# Patient Record
Sex: FEMALE | Race: WHITE | Employment: FULL TIME | URBAN - METROPOLITAN AREA
[De-identification: names, ages, dates, MRNs, and addresses within clinical notes are randomized per-mention and may not be internally consistent; named-entity substitution may affect disease eponyms.]

---

## 2017-03-22 ENCOUNTER — OFFICE VISIT (OUTPATIENT)
Dept: OBGYN CLINIC | Age: 32
End: 2017-03-22

## 2017-03-22 VITALS
BODY MASS INDEX: 24.34 KG/M2 | DIASTOLIC BLOOD PRESSURE: 78 MMHG | WEIGHT: 155.4 LBS | TEMPERATURE: 98.4 F | HEART RATE: 111 BPM | SYSTOLIC BLOOD PRESSURE: 120 MMHG

## 2017-03-22 DIAGNOSIS — D25.1 INTRAMURAL LEIOMYOMA OF UTERUS: ICD-10-CM

## 2017-03-22 DIAGNOSIS — Z86.32 HISTORY OF GESTATIONAL DIABETES: ICD-10-CM

## 2017-03-22 DIAGNOSIS — Z12.4 PAP SMEAR FOR CERVICAL CANCER SCREENING: ICD-10-CM

## 2017-03-22 DIAGNOSIS — Z01.419 WOMEN'S ANNUAL ROUTINE GYNECOLOGICAL EXAMINATION: Primary | ICD-10-CM

## 2017-03-22 DIAGNOSIS — Z87.59 HISTORY OF PREGNANCY INDUCED HYPERTENSION: ICD-10-CM

## 2017-03-22 DIAGNOSIS — N91.2 AMENORRHEA: ICD-10-CM

## 2017-03-22 DIAGNOSIS — N91.2 AMENORRHEA: Primary | ICD-10-CM

## 2017-03-22 LAB
BACTERIA: ABNORMAL /HPF
BILIRUBIN URINE: NEGATIVE
BLOOD, URINE: NEGATIVE
CLARITY: CLEAR
COLOR: YELLOW
CONTROL: NORMAL
CRL: NORMAL CM
EPITHELIAL CELLS, UA: 1 /HPF (ref 0–5)
GLUCOSE URINE: NEGATIVE MG/DL
HYALINE CASTS: 2 /HPF (ref 0–8)
KETONES, URINE: NEGATIVE MG/DL
LEUKOCYTE ESTERASE, URINE: NEGATIVE
MICROSCOPIC EXAMINATION: ABNORMAL
NITRITE, URINE: NEGATIVE
PH UA: 7
PREGNANCY TEST URINE, POC: POSITIVE
PROTEIN UA: NEGATIVE MG/DL
RBC UA: 7 /HPF (ref 0–4)
SAC DIAMETER: NORMAL CM
SPECIFIC GRAVITY UA: 1.02
UROBILINOGEN, URINE: 0.2 E.U./DL
WBC UA: 2 /HPF (ref 0–5)

## 2017-03-22 PROCEDURE — 99395 PREV VISIT EST AGE 18-39: CPT | Performed by: OBSTETRICS & GYNECOLOGY

## 2017-03-22 PROCEDURE — 81025 URINE PREGNANCY TEST: CPT | Performed by: OBSTETRICS & GYNECOLOGY

## 2017-03-22 PROCEDURE — 81001 URINALYSIS AUTO W/SCOPE: CPT | Performed by: OBSTETRICS & GYNECOLOGY

## 2017-03-22 PROCEDURE — 76801 OB US < 14 WKS SINGLE FETUS: CPT | Performed by: OBSTETRICS & GYNECOLOGY

## 2017-03-22 PROCEDURE — 87210 SMEAR WET MOUNT SALINE/INK: CPT | Performed by: OBSTETRICS & GYNECOLOGY

## 2017-03-22 ASSESSMENT — ENCOUNTER SYMPTOMS
VOMITING: 0
CONSTIPATION: 1
NAUSEA: 0
ABDOMINAL PAIN: 0
DIARRHEA: 0
ABDOMINAL DISTENTION: 0
SHORTNESS OF BREATH: 0

## 2017-03-23 LAB
CHLAMYDIA TRACHOMATIS AMPLIFIED DET: NORMAL
N GONORRHOEAE AMPLIFIED DET: NORMAL

## 2017-03-24 LAB — URINE CULTURE, ROUTINE: NORMAL

## 2017-04-17 ENCOUNTER — INITIAL PRENATAL (OUTPATIENT)
Dept: OBGYN CLINIC | Age: 32
End: 2017-04-17

## 2017-04-17 VITALS
HEART RATE: 90 BPM | SYSTOLIC BLOOD PRESSURE: 112 MMHG | DIASTOLIC BLOOD PRESSURE: 70 MMHG | BODY MASS INDEX: 24.25 KG/M2 | WEIGHT: 154.8 LBS

## 2017-04-17 DIAGNOSIS — Z87.59 HISTORY OF PREGNANCY INDUCED HYPERTENSION: ICD-10-CM

## 2017-04-17 DIAGNOSIS — O09.291: ICD-10-CM

## 2017-04-17 DIAGNOSIS — Q21.9 SEPTAL DEFECT, HEART: ICD-10-CM

## 2017-04-17 DIAGNOSIS — O09.891 HISTORY OF PRETERM DELIVERY, CURRENTLY PREGNANT, FIRST TRIMESTER: ICD-10-CM

## 2017-04-17 DIAGNOSIS — D25.1 INTRAMURAL LEIOMYOMA OF UTERUS: ICD-10-CM

## 2017-04-17 DIAGNOSIS — Z34.81 PRENATAL CARE, SUBSEQUENT PREGNANCY, FIRST TRIMESTER: Primary | ICD-10-CM

## 2017-04-17 DIAGNOSIS — Z86.32 HISTORY OF GESTATIONAL DIABETES: ICD-10-CM

## 2017-04-17 LAB
24HR URINE VOLUME (ML): 2050 ML
A/G RATIO: 1.1 (ref 1.1–2.2)
ABO/RH: NORMAL
ALBUMIN SERPL-MCNC: 3.8 G/DL (ref 3.4–5)
ALP BLD-CCNC: 50 U/L (ref 40–129)
ALT SERPL-CCNC: 6 U/L (ref 10–40)
AMPHETAMINE SCREEN, URINE: NORMAL
ANION GAP SERPL CALCULATED.3IONS-SCNC: 17 MMOL/L (ref 3–16)
ANTIBODY SCREEN: NORMAL
AST SERPL-CCNC: 18 U/L (ref 15–37)
BARBITURATE SCREEN URINE: NORMAL
BASOPHILS ABSOLUTE: 0.1 K/UL (ref 0–0.2)
BASOPHILS RELATIVE PERCENT: 0.6 %
BENZODIAZEPINE SCREEN, URINE: NORMAL
BILIRUB SERPL-MCNC: 0.3 MG/DL (ref 0–1)
BUN BLDV-MCNC: 11 MG/DL (ref 7–20)
CALCIUM SERPL-MCNC: 9.7 MG/DL (ref 8.3–10.6)
CANNABINOID SCREEN URINE: NORMAL
CHLORIDE BLD-SCNC: 98 MMOL/L (ref 99–110)
CO2: 21 MMOL/L (ref 21–32)
COCAINE METABOLITE SCREEN URINE: NORMAL
CREAT SERPL-MCNC: <0.5 MG/DL (ref 0.6–1.1)
CREAT SERPL-MCNC: <0.5 MG/DL (ref 0.6–1.2)
CREATININE 24 HOUR URINE: 1.5 G/24HR (ref 0.6–1.5)
EOSINOPHILS ABSOLUTE: 0.1 K/UL (ref 0–0.6)
EOSINOPHILS RELATIVE PERCENT: 0.9 %
GFR AFRICAN AMERICAN: >60
GFR NON-AFRICAN AMERICAN: >60
GLOBULIN: 3.5 G/DL
GLUCOSE BLD-MCNC: 87 MG/DL (ref 70–99)
HCT VFR BLD CALC: 42.9 % (ref 36–48)
HEMOGLOBIN: 13.9 G/DL (ref 12–16)
HEPATITIS B SURFACE ANTIGEN INTERPRETATION: NORMAL
LYMPHOCYTES ABSOLUTE: 1.8 K/UL (ref 1–5.1)
LYMPHOCYTES RELATIVE PERCENT: 15.8 %
Lab: NORMAL
MCH RBC QN AUTO: 29.4 PG (ref 26–34)
MCHC RBC AUTO-ENTMCNC: 32.4 G/DL (ref 31–36)
MCV RBC AUTO: 90.6 FL (ref 80–100)
METHADONE SCREEN, URINE: NORMAL
MONOCYTES ABSOLUTE: 0.6 K/UL (ref 0–1.3)
MONOCYTES RELATIVE PERCENT: 5.1 %
NEUTROPHILS ABSOLUTE: 8.8 K/UL (ref 1.7–7.7)
NEUTROPHILS RELATIVE PERCENT: 77.6 %
OPIATE SCREEN URINE: NORMAL
OXYCODONE URINE: NORMAL
PDW BLD-RTO: 12.8 % (ref 12.4–15.4)
PH UA: 5
PHENCYCLIDINE SCREEN URINE: NORMAL
PLATELET # BLD: 347 K/UL (ref 135–450)
PMV BLD AUTO: 8.4 FL (ref 5–10.5)
POTASSIUM SERPL-SCNC: 4.4 MMOL/L (ref 3.5–5.1)
PROPOXYPHENE SCREEN: NORMAL
PROTEIN 24 HOUR URINE: 0.14 G/24HR
RBC # BLD: 4.73 M/UL (ref 4–5.2)
RUBELLA ANTIBODY IGG: 189.5 IU/ML
SODIUM BLD-SCNC: 136 MMOL/L (ref 136–145)
TOTAL PROTEIN: 7.3 G/DL (ref 6.4–8.2)
TSH REFLEX: 1.09 UIU/ML (ref 0.27–4.2)
URIC ACID, SERUM: 4.7 MG/DL (ref 2.6–6)
WBC # BLD: 11.3 K/UL (ref 4–11)

## 2017-04-17 PROCEDURE — 0501F PRENATAL FLOW SHEET: CPT | Performed by: OBSTETRICS & GYNECOLOGY

## 2017-04-17 PROCEDURE — 36415 COLL VENOUS BLD VENIPUNCTURE: CPT | Performed by: OBSTETRICS & GYNECOLOGY

## 2017-04-17 RX ORDER — ASPIRIN 81 MG/1
81 TABLET ORAL DAILY
COMMUNITY
Start: 2017-04-17 | End: 2017-10-11 | Stop reason: ALTCHOICE

## 2017-04-18 LAB
ESTIMATED AVERAGE GLUCOSE: 105.4 MG/DL
HBA1C MFR BLD: 5.3 %
HIV-1 AND HIV-2 ANTIBODIES: NORMAL
RPR: NORMAL

## 2017-05-08 ENCOUNTER — ROUTINE PRENATAL (OUTPATIENT)
Dept: OBGYN CLINIC | Age: 32
End: 2017-05-08

## 2017-05-08 ENCOUNTER — OFFICE VISIT (OUTPATIENT)
Dept: OBGYN CLINIC | Age: 32
End: 2017-05-08

## 2017-05-08 VITALS
BODY MASS INDEX: 24.75 KG/M2 | HEART RATE: 96 BPM | DIASTOLIC BLOOD PRESSURE: 68 MMHG | WEIGHT: 158 LBS | SYSTOLIC BLOOD PRESSURE: 106 MMHG

## 2017-05-08 DIAGNOSIS — Q21.9 SEPTAL DEFECT, HEART: ICD-10-CM

## 2017-05-08 DIAGNOSIS — Z86.32 HISTORY OF GESTATIONAL DIABETES: ICD-10-CM

## 2017-05-08 DIAGNOSIS — Z87.59 HISTORY OF PREGNANCY INDUCED HYPERTENSION: ICD-10-CM

## 2017-05-08 DIAGNOSIS — D25.9 UTERINE LEIOMYOMA, UNSPECIFIED LOCATION: Primary | ICD-10-CM

## 2017-05-08 DIAGNOSIS — D25.1 INTRAMURAL LEIOMYOMA OF UTERUS: ICD-10-CM

## 2017-05-08 DIAGNOSIS — Z34.82 PRENATAL CARE, SUBSEQUENT PREGNANCY IN SECOND TRIMESTER: Primary | ICD-10-CM

## 2017-05-08 LAB
ABDOMINAL CIRCUMFERENCE: NORMAL CM
BIPARIETAL DIAMETER: NORMAL CM
ESTIMATED FETAL WEIGHT: NORMAL GRAMS
FEMORAL DIAMETER: NORMAL CM
HC/AC: NORMAL
HEAD CIRCUMFERENCE: NORMAL CM

## 2017-05-08 PROCEDURE — 0502F SUBSEQUENT PRENATAL CARE: CPT | Performed by: OBSTETRICS & GYNECOLOGY

## 2017-05-08 PROCEDURE — 76816 OB US FOLLOW-UP PER FETUS: CPT | Performed by: OBSTETRICS & GYNECOLOGY

## 2017-05-09 PROBLEM — Z34.82 PRENATAL CARE, SUBSEQUENT PREGNANCY IN SECOND TRIMESTER: Status: ACTIVE | Noted: 2017-05-09

## 2017-06-05 ENCOUNTER — OFFICE VISIT (OUTPATIENT)
Dept: OBGYN CLINIC | Age: 32
End: 2017-06-05

## 2017-06-05 ENCOUNTER — ROUTINE PRENATAL (OUTPATIENT)
Dept: OBGYN CLINIC | Age: 32
End: 2017-06-05

## 2017-06-05 VITALS
WEIGHT: 162 LBS | HEART RATE: 101 BPM | DIASTOLIC BLOOD PRESSURE: 66 MMHG | SYSTOLIC BLOOD PRESSURE: 120 MMHG | BODY MASS INDEX: 25.37 KG/M2

## 2017-06-05 DIAGNOSIS — Z87.59 HISTORY OF PREGNANCY INDUCED HYPERTENSION: ICD-10-CM

## 2017-06-05 DIAGNOSIS — Z86.32 HISTORY OF GESTATIONAL DIABETES: ICD-10-CM

## 2017-06-05 DIAGNOSIS — Z34.82 PRENATAL CARE, SUBSEQUENT PREGNANCY IN SECOND TRIMESTER: Primary | ICD-10-CM

## 2017-06-05 DIAGNOSIS — Q21.9 SEPTAL DEFECT, HEART: ICD-10-CM

## 2017-06-05 DIAGNOSIS — Z34.92 PRENATAL CARE IN SECOND TRIMESTER: Primary | ICD-10-CM

## 2017-06-05 DIAGNOSIS — D25.1 INTRAMURAL LEIOMYOMA OF UTERUS: ICD-10-CM

## 2017-06-05 PROCEDURE — 76805 OB US >/= 14 WKS SNGL FETUS: CPT | Performed by: OBSTETRICS & GYNECOLOGY

## 2017-06-05 PROCEDURE — 0502F SUBSEQUENT PRENATAL CARE: CPT | Performed by: OBSTETRICS & GYNECOLOGY

## 2017-06-21 ENCOUNTER — ROUTINE PRENATAL (OUTPATIENT)
Dept: OBGYN CLINIC | Age: 32
End: 2017-06-21

## 2017-06-21 ENCOUNTER — OFFICE VISIT (OUTPATIENT)
Dept: OBGYN CLINIC | Age: 32
End: 2017-06-21

## 2017-06-21 VITALS
WEIGHT: 165 LBS | SYSTOLIC BLOOD PRESSURE: 102 MMHG | BODY MASS INDEX: 25.84 KG/M2 | HEART RATE: 102 BPM | DIASTOLIC BLOOD PRESSURE: 60 MMHG

## 2017-06-21 DIAGNOSIS — Z86.32 HISTORY OF GESTATIONAL DIABETES: ICD-10-CM

## 2017-06-21 DIAGNOSIS — O28.3 ABNORMAL FETAL ULTRASOUND: ICD-10-CM

## 2017-06-21 DIAGNOSIS — Z34.82 PRENATAL CARE, SUBSEQUENT PREGNANCY IN SECOND TRIMESTER: Primary | ICD-10-CM

## 2017-06-21 DIAGNOSIS — Q21.9 SEPTAL DEFECT, HEART: ICD-10-CM

## 2017-06-21 DIAGNOSIS — D25.1 INTRAMURAL LEIOMYOMA OF UTERUS: ICD-10-CM

## 2017-06-21 DIAGNOSIS — O09.892 HISTORY OF PRETERM DELIVERY, CURRENTLY PREGNANT, SECOND TRIMESTER: Primary | ICD-10-CM

## 2017-06-21 DIAGNOSIS — Z87.59 HISTORY OF PREGNANCY INDUCED HYPERTENSION: ICD-10-CM

## 2017-06-21 PROCEDURE — 76815 OB US LIMITED FETUS(S): CPT | Performed by: OBSTETRICS & GYNECOLOGY

## 2017-06-21 PROCEDURE — 0502F SUBSEQUENT PRENATAL CARE: CPT | Performed by: OBSTETRICS & GYNECOLOGY

## 2017-06-27 ENCOUNTER — ROUTINE PRENATAL (OUTPATIENT)
Dept: PERINATAL CARE | Age: 32
End: 2017-06-27

## 2017-06-27 ENCOUNTER — HOSPITAL ENCOUNTER (OUTPATIENT)
Dept: OTHER | Age: 32
Discharge: HOME | End: 2017-06-30
Attending: OBSTETRICS & GYNECOLOGY | Admitting: OBSTETRICS & GYNECOLOGY

## 2017-06-27 DIAGNOSIS — Z03.73 SUSPECTED FETAL ANOMALY NOT FOUND: Primary | ICD-10-CM

## 2017-07-03 ENCOUNTER — ROUTINE PRENATAL (OUTPATIENT)
Dept: OBGYN CLINIC | Age: 32
End: 2017-07-03

## 2017-07-03 VITALS
BODY MASS INDEX: 26.16 KG/M2 | SYSTOLIC BLOOD PRESSURE: 112 MMHG | WEIGHT: 167 LBS | HEART RATE: 102 BPM | DIASTOLIC BLOOD PRESSURE: 72 MMHG

## 2017-07-03 DIAGNOSIS — Z34.82 PRENATAL CARE, SUBSEQUENT PREGNANCY IN SECOND TRIMESTER: Primary | ICD-10-CM

## 2017-07-03 DIAGNOSIS — Z86.32 HISTORY OF GESTATIONAL DIABETES: ICD-10-CM

## 2017-07-03 DIAGNOSIS — D25.1 INTRAMURAL LEIOMYOMA OF UTERUS: ICD-10-CM

## 2017-07-03 DIAGNOSIS — Q21.9 SEPTAL DEFECT, HEART: ICD-10-CM

## 2017-07-03 DIAGNOSIS — Z87.59 HISTORY OF PREGNANCY INDUCED HYPERTENSION: ICD-10-CM

## 2017-07-03 LAB — GLUCOSE CHALLENGE: 103 MG/DL

## 2017-07-03 PROCEDURE — 0502F SUBSEQUENT PRENATAL CARE: CPT | Performed by: OBSTETRICS & GYNECOLOGY

## 2017-07-03 PROCEDURE — 36415 COLL VENOUS BLD VENIPUNCTURE: CPT | Performed by: OBSTETRICS & GYNECOLOGY

## 2017-08-02 ENCOUNTER — ROUTINE PRENATAL (OUTPATIENT)
Dept: OBGYN CLINIC | Age: 32
End: 2017-08-02

## 2017-08-02 VITALS
WEIGHT: 174.8 LBS | BODY MASS INDEX: 27.44 KG/M2 | DIASTOLIC BLOOD PRESSURE: 68 MMHG | HEIGHT: 67 IN | HEART RATE: 110 BPM | SYSTOLIC BLOOD PRESSURE: 118 MMHG

## 2017-08-02 DIAGNOSIS — D25.1 INTRAMURAL LEIOMYOMA OF UTERUS: ICD-10-CM

## 2017-08-02 DIAGNOSIS — Z86.32 HISTORY OF GESTATIONAL DIABETES: ICD-10-CM

## 2017-08-02 DIAGNOSIS — Z87.59 HISTORY OF PREGNANCY INDUCED HYPERTENSION: ICD-10-CM

## 2017-08-02 DIAGNOSIS — Z34.83 PRENATAL CARE, SUBSEQUENT PREGNANCY IN THIRD TRIMESTER: Primary | ICD-10-CM

## 2017-08-02 LAB
BASOPHILS ABSOLUTE: 0 K/UL (ref 0–0.2)
BASOPHILS RELATIVE PERCENT: 0.3 %
EOSINOPHILS ABSOLUTE: 0.1 K/UL (ref 0–0.6)
EOSINOPHILS RELATIVE PERCENT: 0.8 %
GLUCOSE CHALLENGE: 131 MG/DL
HCT VFR BLD CALC: 36.9 % (ref 36–48)
HEMOGLOBIN: 12.2 G/DL (ref 12–16)
LYMPHOCYTES ABSOLUTE: 2.1 K/UL (ref 1–5.1)
LYMPHOCYTES RELATIVE PERCENT: 17.4 %
MCH RBC QN AUTO: 30 PG (ref 26–34)
MCHC RBC AUTO-ENTMCNC: 33.2 G/DL (ref 31–36)
MCV RBC AUTO: 90.5 FL (ref 80–100)
MONOCYTES ABSOLUTE: 0.9 K/UL (ref 0–1.3)
MONOCYTES RELATIVE PERCENT: 8 %
NEUTROPHILS ABSOLUTE: 8.7 K/UL (ref 1.7–7.7)
NEUTROPHILS RELATIVE PERCENT: 73.5 %
PDW BLD-RTO: 14.5 % (ref 12.4–15.4)
PLATELET # BLD: 257 K/UL (ref 135–450)
PMV BLD AUTO: 8.2 FL (ref 5–10.5)
RBC # BLD: 4.07 M/UL (ref 4–5.2)
WBC # BLD: 11.8 K/UL (ref 4–11)

## 2017-08-02 PROCEDURE — 0502F SUBSEQUENT PRENATAL CARE: CPT | Performed by: OBSTETRICS & GYNECOLOGY

## 2017-08-02 PROCEDURE — 90471 IMMUNIZATION ADMIN: CPT | Performed by: OBSTETRICS & GYNECOLOGY

## 2017-08-02 PROCEDURE — 36415 COLL VENOUS BLD VENIPUNCTURE: CPT | Performed by: OBSTETRICS & GYNECOLOGY

## 2017-08-02 PROCEDURE — 90715 TDAP VACCINE 7 YRS/> IM: CPT | Performed by: OBSTETRICS & GYNECOLOGY

## 2017-08-03 LAB
ESTIMATED AVERAGE GLUCOSE: 108.3 MG/DL
HBA1C MFR BLD: 5.4 %

## 2017-08-04 ENCOUNTER — NURSE ONLY (OUTPATIENT)
Dept: OBGYN CLINIC | Age: 32
End: 2017-08-04

## 2017-08-04 VITALS
SYSTOLIC BLOOD PRESSURE: 112 MMHG | TEMPERATURE: 98.2 F | WEIGHT: 171.38 LBS | HEART RATE: 101 BPM | BODY MASS INDEX: 27.25 KG/M2 | DIASTOLIC BLOOD PRESSURE: 66 MMHG

## 2017-08-04 DIAGNOSIS — R73.09 ELEVATED GLUCOSE TOLERANCE TEST: Primary | ICD-10-CM

## 2017-08-04 LAB
GLUCOSE FASTING: 65 MG/DL
GLUCOSE TOLERANCE TEST 1 HOUR: 199 MG/DL
GLUCOSE TOLERANCE TEST 2 HOUR: 144 MG/DL
GLUCOSE TOLERANCE TEST 3 HOUR: 161 MG/DL

## 2017-08-08 ENCOUNTER — TELEPHONE (OUTPATIENT)
Dept: OBGYN CLINIC | Age: 32
End: 2017-08-08

## 2017-08-08 RX ORDER — BLOOD-GLUCOSE METER
1 KIT MISCELLANEOUS SEE ADMIN INSTRUCTIONS
Qty: 1 KIT | Refills: 0 | Status: SHIPPED | OUTPATIENT
Start: 2017-08-08 | End: 2021-03-08

## 2017-08-08 RX ORDER — LANCETS
1 EACH MISCELLANEOUS SEE ADMIN INSTRUCTIONS
Qty: 100 EACH | Refills: 3 | Status: SHIPPED | OUTPATIENT
Start: 2017-08-08 | End: 2021-03-08

## 2017-08-08 RX ORDER — UBIQUINOL 100 MG
1 CAPSULE ORAL SEE ADMIN INSTRUCTIONS
Qty: 100 EACH | Refills: 3 | Status: SHIPPED | OUTPATIENT
Start: 2017-08-08 | End: 2021-03-08

## 2017-08-16 ENCOUNTER — ROUTINE PRENATAL (OUTPATIENT)
Dept: OBGYN CLINIC | Age: 32
End: 2017-08-16

## 2017-08-16 ENCOUNTER — TELEPHONE (OUTPATIENT)
Dept: OBGYN CLINIC | Age: 32
End: 2017-08-16

## 2017-08-16 VITALS
BODY MASS INDEX: 27.28 KG/M2 | HEART RATE: 106 BPM | SYSTOLIC BLOOD PRESSURE: 114 MMHG | WEIGHT: 171.6 LBS | DIASTOLIC BLOOD PRESSURE: 62 MMHG

## 2017-08-16 DIAGNOSIS — D25.1 INTRAMURAL LEIOMYOMA OF UTERUS: ICD-10-CM

## 2017-08-16 DIAGNOSIS — O24.410 DIET CONTROLLED GESTATIONAL DIABETES MELLITUS (GDM) IN THIRD TRIMESTER: ICD-10-CM

## 2017-08-16 DIAGNOSIS — Z87.59 HISTORY OF PREGNANCY INDUCED HYPERTENSION: ICD-10-CM

## 2017-08-16 DIAGNOSIS — Z34.83 PRENATAL CARE, SUBSEQUENT PREGNANCY IN THIRD TRIMESTER: Primary | ICD-10-CM

## 2017-08-16 DIAGNOSIS — Z86.32 HISTORY OF GESTATIONAL DIABETES: ICD-10-CM

## 2017-08-16 DIAGNOSIS — Q21.9 SEPTAL DEFECT, HEART: ICD-10-CM

## 2017-08-16 PROCEDURE — 0502F SUBSEQUENT PRENATAL CARE: CPT | Performed by: OBSTETRICS & GYNECOLOGY

## 2017-08-18 PROBLEM — O24.410 DIET CONTROLLED GESTATIONAL DIABETES MELLITUS (GDM) IN THIRD TRIMESTER: Status: ACTIVE | Noted: 2017-08-18

## 2017-08-30 ENCOUNTER — OFFICE VISIT (OUTPATIENT)
Dept: OBGYN CLINIC | Age: 32
End: 2017-08-30

## 2017-08-30 ENCOUNTER — ROUTINE PRENATAL (OUTPATIENT)
Dept: OBGYN CLINIC | Age: 32
End: 2017-08-30

## 2017-08-30 VITALS
BODY MASS INDEX: 27.41 KG/M2 | WEIGHT: 172.4 LBS | SYSTOLIC BLOOD PRESSURE: 124 MMHG | DIASTOLIC BLOOD PRESSURE: 78 MMHG | HEART RATE: 94 BPM

## 2017-08-30 DIAGNOSIS — Z86.32 HISTORY OF GESTATIONAL DIABETES: ICD-10-CM

## 2017-08-30 DIAGNOSIS — Z34.83 PRENATAL CARE, SUBSEQUENT PREGNANCY IN THIRD TRIMESTER: Primary | ICD-10-CM

## 2017-08-30 DIAGNOSIS — Z87.59 HISTORY OF PREGNANCY INDUCED HYPERTENSION: ICD-10-CM

## 2017-08-30 DIAGNOSIS — O24.410 DIET CONTROLLED GESTATIONAL DIABETES MELLITUS (GDM) IN THIRD TRIMESTER: ICD-10-CM

## 2017-08-30 DIAGNOSIS — Z86.32 HISTORY OF GESTATIONAL DIABETES: Primary | ICD-10-CM

## 2017-08-30 DIAGNOSIS — D25.1 INTRAMURAL LEIOMYOMA OF UTERUS: ICD-10-CM

## 2017-08-30 DIAGNOSIS — Q21.9 SEPTAL DEFECT, HEART: ICD-10-CM

## 2017-08-30 PROCEDURE — 0502F SUBSEQUENT PRENATAL CARE: CPT | Performed by: OBSTETRICS & GYNECOLOGY

## 2017-08-30 PROCEDURE — 76815 OB US LIMITED FETUS(S): CPT | Performed by: OBSTETRICS & GYNECOLOGY

## 2017-08-30 RX ORDER — GLYBURIDE 2.5 MG/1
2.5 TABLET ORAL
Qty: 30 TABLET | Refills: 3 | Status: SHIPPED | OUTPATIENT
Start: 2017-08-30 | End: 2017-09-13 | Stop reason: ALTCHOICE

## 2017-08-31 ENCOUNTER — TELEPHONE (OUTPATIENT)
Dept: OBGYN CLINIC | Age: 32
End: 2017-08-31

## 2017-09-01 ENCOUNTER — TELEPHONE (OUTPATIENT)
Dept: OBGYN CLINIC | Age: 32
End: 2017-09-01

## 2017-09-01 RX ORDER — BLOOD SUGAR DIAGNOSTIC
1 STRIP MISCELLANEOUS DAILY
Qty: 100 EACH | Refills: 3 | Status: ON HOLD | OUTPATIENT
Start: 2017-09-01 | End: 2017-10-19 | Stop reason: HOSPADM

## 2017-09-13 ENCOUNTER — ROUTINE PRENATAL (OUTPATIENT)
Dept: OBGYN CLINIC | Age: 32
End: 2017-09-13

## 2017-09-13 VITALS
HEART RATE: 108 BPM | SYSTOLIC BLOOD PRESSURE: 108 MMHG | DIASTOLIC BLOOD PRESSURE: 74 MMHG | BODY MASS INDEX: 27.57 KG/M2 | WEIGHT: 173.4 LBS

## 2017-09-13 DIAGNOSIS — Q21.9 SEPTAL DEFECT, HEART: ICD-10-CM

## 2017-09-13 DIAGNOSIS — Z86.32 HISTORY OF GESTATIONAL DIABETES: ICD-10-CM

## 2017-09-13 DIAGNOSIS — D25.1 INTRAMURAL LEIOMYOMA OF UTERUS: ICD-10-CM

## 2017-09-13 DIAGNOSIS — Z34.03 ENCOUNTER FOR SUPERVISION OF NORMAL FIRST PREGNANCY IN THIRD TRIMESTER: ICD-10-CM

## 2017-09-13 DIAGNOSIS — Z34.83 PRENATAL CARE, SUBSEQUENT PREGNANCY IN THIRD TRIMESTER: Primary | ICD-10-CM

## 2017-09-13 DIAGNOSIS — O24.410 DIET CONTROLLED GESTATIONAL DIABETES MELLITUS (GDM) IN THIRD TRIMESTER: ICD-10-CM

## 2017-09-13 DIAGNOSIS — Z87.59 HISTORY OF PREGNANCY INDUCED HYPERTENSION: ICD-10-CM

## 2017-09-13 PROCEDURE — 0502F SUBSEQUENT PRENATAL CARE: CPT | Performed by: OBSTETRICS & GYNECOLOGY

## 2017-09-19 ENCOUNTER — ROUTINE PRENATAL (OUTPATIENT)
Dept: OBGYN CLINIC | Age: 32
End: 2017-09-19

## 2017-09-19 VITALS
DIASTOLIC BLOOD PRESSURE: 60 MMHG | WEIGHT: 172 LBS | HEART RATE: 102 BPM | SYSTOLIC BLOOD PRESSURE: 112 MMHG | BODY MASS INDEX: 27.35 KG/M2

## 2017-09-19 DIAGNOSIS — Z87.59 HISTORY OF PREGNANCY INDUCED HYPERTENSION: ICD-10-CM

## 2017-09-19 DIAGNOSIS — Z34.83 PRENATAL CARE, SUBSEQUENT PREGNANCY IN THIRD TRIMESTER: Primary | ICD-10-CM

## 2017-09-19 DIAGNOSIS — O24.414 INSULIN CONTROLLED GESTATIONAL DIABETES MELLITUS (GDM) IN THIRD TRIMESTER: ICD-10-CM

## 2017-09-19 DIAGNOSIS — D25.1 INTRAMURAL LEIOMYOMA OF UTERUS: ICD-10-CM

## 2017-09-19 PROCEDURE — 0502F SUBSEQUENT PRENATAL CARE: CPT | Performed by: OBSTETRICS & GYNECOLOGY

## 2017-09-19 PROCEDURE — 59025 FETAL NON-STRESS TEST: CPT | Performed by: OBSTETRICS & GYNECOLOGY

## 2017-09-25 PROBLEM — O24.414 INSULIN CONTROLLED GESTATIONAL DIABETES MELLITUS (GDM) IN THIRD TRIMESTER: Status: ACTIVE | Noted: 2017-08-18

## 2017-09-27 ENCOUNTER — OFFICE VISIT (OUTPATIENT)
Dept: OBGYN CLINIC | Age: 32
End: 2017-09-27

## 2017-09-27 ENCOUNTER — ROUTINE PRENATAL (OUTPATIENT)
Dept: OBGYN CLINIC | Age: 32
End: 2017-09-27

## 2017-09-27 VITALS
WEIGHT: 171.8 LBS | DIASTOLIC BLOOD PRESSURE: 80 MMHG | BODY MASS INDEX: 27.31 KG/M2 | SYSTOLIC BLOOD PRESSURE: 112 MMHG | HEART RATE: 109 BPM

## 2017-09-27 DIAGNOSIS — Z87.59 HISTORY OF PREGNANCY INDUCED HYPERTENSION: Primary | ICD-10-CM

## 2017-09-27 DIAGNOSIS — D25.1 INTRAMURAL LEIOMYOMA OF UTERUS: ICD-10-CM

## 2017-09-27 DIAGNOSIS — O24.414 INSULIN CONTROLLED GESTATIONAL DIABETES MELLITUS (GDM) IN THIRD TRIMESTER: ICD-10-CM

## 2017-09-27 DIAGNOSIS — Z86.32 HISTORY OF GESTATIONAL DIABETES: ICD-10-CM

## 2017-09-27 DIAGNOSIS — Z87.59 HISTORY OF PREGNANCY INDUCED HYPERTENSION: ICD-10-CM

## 2017-09-27 DIAGNOSIS — Q21.9 SEPTAL DEFECT, HEART: ICD-10-CM

## 2017-09-27 DIAGNOSIS — Z34.83 PRENATAL CARE, SUBSEQUENT PREGNANCY IN THIRD TRIMESTER: Primary | ICD-10-CM

## 2017-09-27 PROCEDURE — 59025 FETAL NON-STRESS TEST: CPT | Performed by: OBSTETRICS & GYNECOLOGY

## 2017-09-27 PROCEDURE — 0502F SUBSEQUENT PRENATAL CARE: CPT | Performed by: OBSTETRICS & GYNECOLOGY

## 2017-09-27 PROCEDURE — 76815 OB US LIMITED FETUS(S): CPT | Performed by: OBSTETRICS & GYNECOLOGY

## 2017-09-27 NOTE — MR AVS SNAPSHOT
Susceptibility testing of penicillin and other beta-lactams is  not necessary for beta hemolytic Streptococci since resistant  strains have not been identified. (CLSI M341-M28, 2017)        Narrative           ORDER#: [id number]                          ORDERED BY: Roger Olson  SOURCE: Vagina                             COLLECTED:  09/27/17 17:07  ANTIBIOTICS AT [facility].:                      RECEIVED :  09/27/17 21:57  Performed at:  [place] [address], [place] [facility] [address]   Phone [phone]      Result Notes           Notes Recorded by Alisha Carmichael MA on 10/2/2017 at 8:35 AM  [phone] (home) [phone] (work)  Spoke to patient, she is aware of test results. ------    Notes Recorded by Xin Fotnanez DO on 10/1/2017 at 8:17 AM  Please notify patient of positive group B beta strep testing. Will require antibiotics while in labor. [place] [address] - [place] FETAL NON-STRESS TEST      Result Information     Status          Final result (Resulted: 9/27/2017)           10/5/2017  5:41 PM      Impression           NON STRESS TEST    DATE: 9/27/17  :   Sean Read. CAESAR Matthews      COMPARISON: 9/19/17  INDICATION: insulin controlled diabetes    IMPRESSION:  Fetal heart tones: 130's,  NST category: 1, reactive,  Tocometry: no contractions.                       Additional Information        Basic Information     Date Of Birth Sex Race Ethnicity Preferred Language    1985 Female White Non-/Non  English      Problem List as of 9/27/2017  Date Reviewed: 12/9/2015                Insulin controlled gestational diabetes mellitus (GDM) in third trimester    Prenatal care, subsequent pregnancy in third trimester    Septal defect, heart    History of gestational diabetes    History of pregnancy induced hypertension    Uterine fibroid      Immunizations as of 9/27/2017     Name Date    Influenza Virus Vaccine 10/30/2015 Tdap (Boostrix, Adacel) 8/2/2017, 8/7/2015      Preventive Care        Date Due    Yearly Flu Vaccine (1) 9/1/2017    Pap Smear 3/22/2020    Tetanus Combination Vaccine (2 - Td) 8/2/2027            MyChart Signup           [employer] allows you to send messages to your doctor, view your test results, renew your prescriptions, schedule appointments, view visit notes, and more. How Do I Sign Up? 1. In your Internet browser, go to [email or web address]  2. Click on the Sign Up Now link in the Sign In box. You will see the New Member Sign Up page. 3. Enter your [employer] Access Code exactly as it appears below. You will not need to use this code after youve completed the sign-up process. If you do not sign up before the expiration date, you must request a new code. [employer] Access Code: [email or web address]  Expires: 10/30/2017 12:12 AM    4. Enter your Social Security Number ([id number]) and Date of Birth (mm/dd/yyyy) as indicated and click Submit. You will be taken to the next sign-up page. 5. Create a [employer] ID. This will be your [employer] login ID and cannot be changed, so think of one that is secure and easy to remember. 6. Create a [employer] password. You can change your password at any time. 7. Enter your Password Reset Question and Answer. This can be used at a later time if you forget your password. 8. Enter your e-mail address. You will receive e-mail notification when new information is available in [address]. 9. Click Sign Up. You can now view your medical record. Additional Information  If you have questions, please contact the physician practice where you receive care. Remember, [employer] is NOT to be used for urgent needs. For medical emergencies, dial 911. For questions regarding your [employer] account call [phone]. If you have a clinical question, please call your doctor's office.

## 2017-09-30 LAB
GROUP B STREP CULTURE: ABNORMAL
GROUP B STREP CULTURE: ABNORMAL
ORGANISM: ABNORMAL

## 2017-10-02 ENCOUNTER — ROUTINE PRENATAL (OUTPATIENT)
Dept: OBGYN CLINIC | Age: 32
End: 2017-10-02

## 2017-10-02 VITALS
WEIGHT: 172.38 LBS | DIASTOLIC BLOOD PRESSURE: 64 MMHG | HEART RATE: 101 BPM | SYSTOLIC BLOOD PRESSURE: 114 MMHG | BODY MASS INDEX: 27.41 KG/M2

## 2017-10-02 DIAGNOSIS — Z86.32 HISTORY OF GESTATIONAL DIABETES: ICD-10-CM

## 2017-10-02 DIAGNOSIS — O24.414 INSULIN CONTROLLED GESTATIONAL DIABETES MELLITUS (GDM) IN THIRD TRIMESTER: ICD-10-CM

## 2017-10-02 DIAGNOSIS — D25.1 INTRAMURAL LEIOMYOMA OF UTERUS: ICD-10-CM

## 2017-10-02 DIAGNOSIS — Z87.59 HISTORY OF PREGNANCY INDUCED HYPERTENSION: ICD-10-CM

## 2017-10-02 DIAGNOSIS — Z34.83 PRENATAL CARE, SUBSEQUENT PREGNANCY IN THIRD TRIMESTER: Primary | ICD-10-CM

## 2017-10-02 DIAGNOSIS — Q21.9 SEPTAL DEFECT, HEART: ICD-10-CM

## 2017-10-02 PROCEDURE — 59025 FETAL NON-STRESS TEST: CPT | Performed by: OBSTETRICS & GYNECOLOGY

## 2017-10-02 PROCEDURE — 0502F SUBSEQUENT PRENATAL CARE: CPT | Performed by: OBSTETRICS & GYNECOLOGY

## 2017-10-02 NOTE — PROGRESS NOTES
Temp:97.6 f oral  Maternal emotional well being screening form completed and reviewed with patient. Current score is 0. Patient given referral to [address] [place] [address] ([phone]):  No

## 2017-10-05 NOTE — PROGRESS NOTES
Maternal emotional well being screening form completed and reviewed with patient. Current score is 0.      Patient given referral to [address] [place] [address] ([phone]): No  Temp:98.0F
NON-STRESS TEST   2. Insulin controlled gestational diabetes mellitus (GDM) in third trimester  [address] - [place] FETAL NON-STRESS TEST   3. Septal defect, heart     4. History of gestational diabetes     5. History of pregnancy induced hypertension     6.  Intramural leiomyoma of uterus       Orders Placed This Encounter   Procedures    Strep B Screen, Vaginal / Rectal    36239 - [place] FETAL NON-STRESS TEST    adjustments to insulin reviewed  Glucoregulation    Follow up prn and 1 week

## 2017-10-09 NOTE — PROGRESS NOTES
No vaginal bleeding, no LOF, no contractions, +FM  2/50/-3 vertex    Glucose levels:  Fasting glucose: 83-99  1 hour postprandial breakfast:  105-138  1 hour postprandial lunch:   1 hour postprandial dinner: 108-126  Bedtime:         NON STRESS TEST    DATE: 10/2/17    :  Lawrence Matthews D.O.    COMPARISON: 9/27/17    IMPRESSION:  Fetal heart tones: 140's,  NST category: 1, reactive,  Tocometry: no contractions. Maternal Wellness Questionnaire reviewed--no concerns    1. Prenatal care, subsequent pregnancy in third trimester  87718 - [place] FETAL NON-STRESS TEST   2. History of gestational diabetes     3. History of pregnancy induced hypertension     4. Intramural leiomyoma of uterus     5. Septal defect, heart     6. Insulin controlled gestational diabetes mellitus (GDM) in third trimester  [address]82 - [place] FETAL NON-STRESS TEST     Orders Placed This Encounter   Procedures    50628 - [place] FETAL NON-STRESS TEST     Continue with present insulin dosing  Labor precautions, kick counts  Follow up in 1 week.

## 2017-10-11 ENCOUNTER — OFFICE VISIT (OUTPATIENT)
Dept: OBGYN CLINIC | Age: 32
End: 2017-10-11

## 2017-10-11 ENCOUNTER — ROUTINE PRENATAL (OUTPATIENT)
Dept: OBGYN CLINIC | Age: 32
End: 2017-10-11

## 2017-10-11 VITALS
SYSTOLIC BLOOD PRESSURE: 112 MMHG | DIASTOLIC BLOOD PRESSURE: 64 MMHG | WEIGHT: 174.2 LBS | HEART RATE: 106 BPM | BODY MASS INDEX: 27.7 KG/M2

## 2017-10-11 DIAGNOSIS — Z34.83 PRENATAL CARE, SUBSEQUENT PREGNANCY IN THIRD TRIMESTER: Primary | ICD-10-CM

## 2017-10-11 DIAGNOSIS — D25.1 INTRAMURAL LEIOMYOMA OF UTERUS: ICD-10-CM

## 2017-10-11 DIAGNOSIS — O24.414 INSULIN CONTROLLED GESTATIONAL DIABETES MELLITUS (GDM) IN THIRD TRIMESTER: ICD-10-CM

## 2017-10-11 DIAGNOSIS — Z87.59 HISTORY OF PREGNANCY INDUCED HYPERTENSION: ICD-10-CM

## 2017-10-11 DIAGNOSIS — O24.414 INSULIN CONTROLLED GESTATIONAL DIABETES MELLITUS (GDM) IN THIRD TRIMESTER: Primary | ICD-10-CM

## 2017-10-11 DIAGNOSIS — Q21.9 SEPTAL DEFECT, HEART: ICD-10-CM

## 2017-10-11 DIAGNOSIS — O99.820 GROUP B STREPTOCOCCUS CARRIER, +RV CULTURE, CURRENTLY PREGNANT: ICD-10-CM

## 2017-10-11 DIAGNOSIS — Z86.32 HISTORY OF GESTATIONAL DIABETES: ICD-10-CM

## 2017-10-11 PROCEDURE — 0502F SUBSEQUENT PRENATAL CARE: CPT | Performed by: OBSTETRICS & GYNECOLOGY

## 2017-10-11 PROCEDURE — 76818 FETAL BIOPHYS PROFILE W/NST: CPT | Performed by: OBSTETRICS & GYNECOLOGY

## 2017-10-11 RX ORDER — AZITHROMYCIN 500 MG/1
500 TABLET, FILM COATED ORAL DAILY
Qty: 1 PACKET | Refills: 0 | Status: CANCELLED | OUTPATIENT
Start: 2017-10-11 | End: 2017-10-14

## 2017-10-11 RX ORDER — AZITHROMYCIN 250 MG/1
TABLET, FILM COATED ORAL
Qty: 1 PACKET | Refills: 0 | Status: ON HOLD | OUTPATIENT
Start: 2017-10-11 | End: 2017-10-19 | Stop reason: HOSPADM

## 2017-10-14 PROBLEM — O99.820 GROUP B STREPTOCOCCUS CARRIER, +RV CULTURE, CURRENTLY PREGNANT: Status: ACTIVE | Noted: 2017-10-14

## 2017-11-02 ENCOUNTER — POSTPARTUM VISIT (OUTPATIENT)
Dept: OBGYN CLINIC | Age: 32
End: 2017-11-02

## 2017-11-02 ENCOUNTER — TELEPHONE (OUTPATIENT)
Dept: OBGYN CLINIC | Age: 32
End: 2017-11-02

## 2017-11-02 VITALS
BODY MASS INDEX: 24.31 KG/M2 | HEART RATE: 107 BPM | TEMPERATURE: 98.7 F | DIASTOLIC BLOOD PRESSURE: 70 MMHG | WEIGHT: 150.6 LBS | SYSTOLIC BLOOD PRESSURE: 114 MMHG

## 2017-11-02 DIAGNOSIS — Z86.32 HISTORY OF GESTATIONAL DIABETES: ICD-10-CM

## 2017-11-02 DIAGNOSIS — N61.0 ACUTE MASTITIS OF LEFT BREAST: Primary | ICD-10-CM

## 2017-11-02 PROCEDURE — 99213 OFFICE O/P EST LOW 20 MIN: CPT | Performed by: OBSTETRICS & GYNECOLOGY

## 2017-11-02 RX ORDER — CEPHALEXIN 500 MG/1
500 CAPSULE ORAL 4 TIMES DAILY
Qty: 40 CAPSULE | Refills: 0 | Status: CANCELLED | OUTPATIENT
Start: 2017-11-02 | End: 2017-11-12

## 2017-11-02 RX ORDER — AMOXICILLIN AND CLAVULANATE POTASSIUM 875; 125 MG/1; MG/1
1 TABLET, FILM COATED ORAL 2 TIMES DAILY
Qty: 20 TABLET | Refills: 0 | Status: SHIPPED | OUTPATIENT
Start: 2017-11-02 | End: 2017-11-12

## 2017-11-02 NOTE — TELEPHONE ENCOUNTER
Patient calling states yesterday morning she woke up with left breast soreness. Reports last night she had a fever of 102 degree F. States she did take ibuprofen and has not had a fever this AM. Reports redness to left breast today and is very sore to touch. Patient wanting to see if she can get antibiotic sent to her pharmacy? Informed patient I would review with physican and recall. Prompted order if OK. Do you want to work patient into schedule for appointment?

## 2017-11-09 PROBLEM — O99.820 GROUP B STREPTOCOCCUS CARRIER, +RV CULTURE, CURRENTLY PREGNANT: Status: RESOLVED | Noted: 2017-10-14 | Resolved: 2017-11-09

## 2017-11-09 PROBLEM — Z34.83 PRENATAL CARE, SUBSEQUENT PREGNANCY IN THIRD TRIMESTER: Status: RESOLVED | Noted: 2017-05-09 | Resolved: 2017-11-09

## 2017-11-09 ASSESSMENT — ENCOUNTER SYMPTOMS
VOMITING: 0
SHORTNESS OF BREATH: 0
CONSTIPATION: 0
NAUSEA: 0
GASTROINTESTINAL NEGATIVE: 1
DIARRHEA: 0
RESPIRATORY NEGATIVE: 1
ABDOMINAL PAIN: 0

## 2017-11-09 NOTE — PROGRESS NOTES
tenderness and discharge. No breast bleeding. Genitourinary Comments: Left breast swelling and erythema consistent with acute mastitis. Area marked with skin pen. Neurological: She is alert and oriented to person, place, and time. Skin: Skin is warm and dry. She is not diaphoretic. Psychiatric: She has a normal mood and affect. Her behavior is normal. Judgment and thought content normal.   Nursing note and vitals reviewed. Assessment:      1. Acute mastitis of left breast     2. Lactating mother     3. History of gestational diabetes             Plan:      Rx Augmentin  Warm compresses  Continue breast feeding and pumping to empty breasts  Follow up in 2 weeks for postpartum visit.

## 2017-11-15 ENCOUNTER — POSTPARTUM VISIT (OUTPATIENT)
Dept: OBGYN CLINIC | Age: 32
End: 2017-11-15

## 2017-11-15 VITALS
WEIGHT: 145.6 LBS | HEART RATE: 93 BPM | TEMPERATURE: 98 F | DIASTOLIC BLOOD PRESSURE: 64 MMHG | SYSTOLIC BLOOD PRESSURE: 112 MMHG | BODY MASS INDEX: 23.5 KG/M2

## 2017-11-15 DIAGNOSIS — Z30.011 ENCOUNTER FOR INITIAL PRESCRIPTION OF CONTRACEPTIVE PILLS: ICD-10-CM

## 2017-11-15 DIAGNOSIS — Z87.59 HISTORY OF PREGNANCY INDUCED HYPERTENSION: ICD-10-CM

## 2017-11-15 DIAGNOSIS — Z86.32 HISTORY OF GESTATIONAL DIABETES: ICD-10-CM

## 2017-11-15 DIAGNOSIS — D25.1 INTRAMURAL LEIOMYOMA OF UTERUS: ICD-10-CM

## 2017-11-15 DIAGNOSIS — Q21.9 SEPTAL DEFECT, HEART: ICD-10-CM

## 2017-11-15 PROCEDURE — 0503F POSTPARTUM CARE VISIT: CPT | Performed by: OBSTETRICS & GYNECOLOGY

## 2017-11-15 RX ORDER — NORETHINDRONE ACETATE AND ETHINYL ESTRADIOL 1.5-30(21)
1 KIT ORAL DAILY
Qty: 3 PACKET | Refills: 3 | Status: SHIPPED | OUTPATIENT
Start: 2017-11-15 | End: 2021-03-08

## 2017-11-15 NOTE — PROGRESS NOTES
Maternal emotional well being screening form completed and reviewed with patient. Current score is 0. Patient given referral to [address] [place] [address] ([phone]):  No

## 2017-11-25 PROBLEM — O24.414 INSULIN CONTROLLED GESTATIONAL DIABETES MELLITUS (GDM) IN THIRD TRIMESTER: Status: RESOLVED | Noted: 2017-08-18 | Resolved: 2017-11-25

## 2017-11-25 ASSESSMENT — ENCOUNTER SYMPTOMS
DIARRHEA: 0
VOMITING: 0
GASTROINTESTINAL NEGATIVE: 1
SHORTNESS OF BREATH: 0
CONSTIPATION: 0
RESPIRATORY NEGATIVE: 1
NAUSEA: 0
ABDOMINAL PAIN: 0

## 2017-11-27 ENCOUNTER — NURSE ONLY (OUTPATIENT)
Dept: OBGYN CLINIC | Age: 32
End: 2017-11-27

## 2017-11-27 VITALS
TEMPERATURE: 97.8 F | WEIGHT: 146.4 LBS | HEART RATE: 81 BPM | DIASTOLIC BLOOD PRESSURE: 72 MMHG | SYSTOLIC BLOOD PRESSURE: 118 MMHG | BODY MASS INDEX: 23.63 KG/M2

## 2017-11-27 DIAGNOSIS — Z86.32 HISTORY OF GESTATIONAL DIABETES: Primary | ICD-10-CM

## 2017-11-27 LAB
GLUCOSE FASTING: 74 MG/DL (ref 0–99)
GLUCOSE TOLERANCE TEST 2 HOUR: 100 MG/DL

## 2017-11-27 PROCEDURE — 36415 COLL VENOUS BLD VENIPUNCTURE: CPT | Performed by: OBSTETRICS & GYNECOLOGY

## 2017-11-27 NOTE — PROGRESS NOTES
Blood draw from R Antecubital x 1 attempt without difficulty. 1 PST tube drawn. Patient tolerated well.  Devika Loaiza

## 2018-05-16 ENCOUNTER — TELEPHONE (OUTPATIENT)
Dept: OBGYN CLINIC | Age: 33
End: 2018-05-16

## 2020-10-05 ENCOUNTER — OFFICE VISIT (OUTPATIENT)
Dept: OBGYN CLINIC | Age: 35
End: 2020-10-05
Payer: COMMERCIAL

## 2020-10-05 VITALS
BODY MASS INDEX: 21.56 KG/M2 | HEIGHT: 67 IN | SYSTOLIC BLOOD PRESSURE: 136 MMHG | WEIGHT: 137.4 LBS | TEMPERATURE: 98.4 F | DIASTOLIC BLOOD PRESSURE: 84 MMHG | HEART RATE: 84 BPM

## 2020-10-05 PROCEDURE — 76856 US EXAM PELVIC COMPLETE: CPT | Performed by: OBSTETRICS & GYNECOLOGY

## 2020-10-05 PROCEDURE — 99214 OFFICE O/P EST MOD 30 MIN: CPT | Performed by: OBSTETRICS & GYNECOLOGY

## 2020-10-05 RX ORDER — NORGESTIMATE AND ETHINYL ESTRADIOL 7DAYSX3 28
1 KIT ORAL DAILY
Qty: 28 TABLET | Refills: 3 | Status: SHIPPED | OUTPATIENT
Start: 2020-10-05 | End: 2021-03-08

## 2020-10-05 NOTE — PROGRESS NOTES
Last PAP was normal; March/2017. Abnormal pap history?  No  Last HPV screen: 2017 Negative  Patient has never had a mammogram.  Last Dexa Scan: N/A   Contraceptive method: Birth Control  No prior colonoscopy

## 2020-10-06 PROBLEM — R18.8 FREE FLUID IN PELVIS: Status: ACTIVE | Noted: 2020-10-06

## 2020-10-06 PROBLEM — N20.0 RENAL LITHIASIS: Status: ACTIVE | Noted: 2020-10-06

## 2020-10-06 ASSESSMENT — ENCOUNTER SYMPTOMS
SHORTNESS OF BREATH: 0
NAUSEA: 0
DIARRHEA: 0
VOMITING: 0
ABDOMINAL DISTENTION: 0
CONSTIPATION: 0
ABDOMINAL PAIN: 0

## 2020-10-06 NOTE — PROGRESS NOTES
Subjective:      Patient ID: Echo Parker is a 28 y.o. female. HPI  27 y/o  female presents for evaluation secondary to heavy and painful menses. Menses occur every month x 1 week, heavy, changes super plus tampon every hour, 6-8 tampons and pads every day, +dysmenorrhea. Has had chronic menstrual issues. Patient is 3 years S/P uncomplicated Vaginal Delivery. The pregnancy was complicated by gestational diabetes A2, previous pregnancies with gestational DM, gestational HTN, fetal demise at 21 weeks and uterine fibroid. Postpartum period was complicated by acute mastitis. Patient is no longer breast feeding. Sexually active, no problems. Does not desire future childbearing. Medical history positive for recent issues with renal lithiasis. Review of Systems   Constitutional: Negative for activity change, appetite change, chills, fatigue, fever and unexpected weight change. Respiratory: Negative for shortness of breath. Cardiovascular: Negative for chest pain. Gastrointestinal: Negative for abdominal distention, abdominal pain, constipation, diarrhea, nausea and vomiting. Endocrine: Negative for cold intolerance and heat intolerance. Genitourinary: Positive for menstrual problem and pelvic pain (dysmenorrhea). Negative for difficulty urinating, dyspareunia, dysuria, frequency, genital sores, hematuria, vaginal bleeding, vaginal discharge and vaginal pain. Skin: Negative for rash. Neurological: Negative for headaches. Hematological: Does not bruise/bleed easily. Objective:   Physical Exam  Vitals signs and nursing note reviewed. Exam conducted with a chaperone present. Constitutional:       General: She is not in acute distress. Appearance: Normal appearance. She is well-developed and normal weight. She is not ill-appearing, toxic-appearing or diaphoretic. HENT:      Head: Normocephalic and atraumatic.    Pulmonary:      Effort: Pulmonary effort is normal. Abdominal:      General: There is no distension. Tenderness: There is no abdominal tenderness. There is no guarding. Genitourinary:     General: Normal vulva. Exam position: Lithotomy position. Pubic Area: No rash. Labia:         Right: No rash, tenderness, lesion or injury. Left: No rash, tenderness, lesion or injury. Vagina: No signs of injury and foreign body. No vaginal discharge, erythema, tenderness, bleeding or lesions. Cervix: No cervical motion tenderness, discharge, friability, lesion, erythema or cervical bleeding. Uterus: Not tender. Skin:     General: Skin is warm and dry. Neurological:      Mental Status: She is alert and oriented to person, place, and time. Psychiatric:         Behavior: Behavior normal.         Thought Content: Thought content normal.         Judgment: Judgment normal.       PELVIC ULTRASOUND without DOPPLER INTERROGATION   NON OB    DATE: 10/5/2020    PHYSICIAN: GENIA Matthews D.O.     SONOGRAPHER: Karen Pineda RDMS    INDICATION: menorrhagia    TYPE OF SCAN: vaginal    FINDINGS:    There is a moderate amount of pelvic free fluid with several septations noted. The cervix is normal and not enlarged. Nabothian cyst/s is not noted within the uterine cervix. The uterus measures 7.69 cm x 5.52 cm x 4.87 cm. The uterus is retroverted. The endometrium measures 8.23 mm. The myometrium is heterogeneous in appearance. A single fundal fibroid is noted measuring 2.00 x 2.47 x 3.32cm. The right ovary is present and normal.  The right ovary measures 3.38 cm x 3.35 cm x 2.20 cm. Ovary findings: No masses seen. The right adnexa is normal.  The left ovary is present and normal.  The left ovary measures 3.06 cm x 2.41 cm x 2.11 cm. Ovary findings: No masses seen. The left adnexa is normal.    IMPRESSION: Fibroid uterus. Pelvic free fluid with septations noted. Normal right ovary. Normal left ovary.   Normal blood flow seen to right and left ovary. Imaging is limited secondary to bowel gas. The patient is well aware of the limitations of ultrasound in the detection of anomalies of the abdomen and pelvis. Assessment:       Diagnosis Orders   1. Menorrhagia with regular cycle  PAP SMEAR   2. Pap smear for cervical cancer screening  PAP SMEAR   3. Intramural leiomyoma of uterus     4. Free fluid in pelvis     5. History of gestational diabetes     6. History of pregnancy induced hypertension     7. Renal lithiasis             Plan:      Orders Placed This Encounter   Procedures    PAP SMEAR    Human papillomavirus (HPV) DNA probe thin prep high risk       Options for treatment of menorrhagia, dysmenorrhea and uterine fibroids reviewed. Rx trial OCPs.    Menstrual diary  Follow up in 3-6 months               Aracely Leung DO

## 2020-10-07 LAB
HPV COMMENT: NORMAL
HPV TYPE 16: NOT DETECTED
HPV TYPE 18: NOT DETECTED
HPVOH (OTHER TYPES): NOT DETECTED

## 2020-10-09 ENCOUNTER — TELEPHONE (OUTPATIENT)
Dept: OBGYN CLINIC | Age: 35
End: 2020-10-09

## 2020-10-09 RX ORDER — HYDROCODONE BITARTRATE AND ACETAMINOPHEN 5; 325 MG/1; MG/1
2 TABLET ORAL EVERY 6 HOURS PRN
Qty: 10 TABLET | Refills: 0 | Status: SHIPPED | OUTPATIENT
Start: 2020-10-09 | End: 2020-10-12

## 2020-10-09 NOTE — TELEPHONE ENCOUNTER
Discussed current symptoms with patient. Advised patient that she will need to report to ER if symptoms worsen/persist.  Bleeding and pain precautions reviewed. Patient states symptoms are severe but occur every month with menstrual cycle. Rx for Vicodin printed--#10. Will fill Rx until able to start OCPs prescribed at recent office visit. Patient to  Rx this afternoon. Thanks.

## 2020-10-12 NOTE — TELEPHONE ENCOUNTER
Pt calling asking if Dr Fermin Sanchez will call her regarding her Pain. She states she has been up since 11:30 and the pain is still the same. She would like possibly something different but is asking if you can call her again. Please advise.

## 2020-10-14 NOTE — TELEPHONE ENCOUNTER
Patient is feeling much better, her symptoms improved. Routing to Dr. Sudhir Arreola as Kika Hinojosa.

## 2020-12-07 ENCOUNTER — TELEPHONE (OUTPATIENT)
Dept: OBGYN CLINIC | Age: 35
End: 2020-12-07

## 2020-12-07 DIAGNOSIS — R10.2 PELVIC PAIN IN FEMALE: Primary | ICD-10-CM

## 2020-12-07 DIAGNOSIS — N94.6 DYSMENORRHEA: ICD-10-CM

## 2020-12-07 DIAGNOSIS — N92.0 MENORRHAGIA WITH REGULAR CYCLE: ICD-10-CM

## 2020-12-07 NOTE — TELEPHONE ENCOUNTER
Pt calling due to abdominal cramps. She says she gets the same  pain monthly. She is due to start her period in about 5 days. She rates her pain 7/10. The pain also radiates to her back. She says you are well aware of her issue. She says she is taking both Tylenol and motrin. Last dose was 2 hours ago but it's not helping. She says she has No N/V. She denies any urinary frequency, urgency or dysuria. She denies any vaginal discharge at this time. She is asking if you could give something stronger for pain. Pt was requesting to speak with you Directly. Please advise.

## 2020-12-09 RX ORDER — HYDROCODONE BITARTRATE AND ACETAMINOPHEN 5; 325 MG/1; MG/1
1 TABLET ORAL EVERY 6 HOURS PRN
Qty: 10 TABLET | Refills: 0 | Status: SHIPPED | OUTPATIENT
Start: 2020-12-09 | End: 2021-01-04 | Stop reason: SDUPTHER

## 2020-12-09 NOTE — TELEPHONE ENCOUNTER
Patient continues to experience debilitating pain and problems with menstrual cycle despite trial of OCPs. In light of severe dysmenorrhea and uterine fibroids (failed conservative measures) will proceed with hysterectomy. Rx for #10 vicodin printed. Please schedule patient for Howard Hector robotic total laparoscopic hysterectomy, bilateral salpingectomy, possible bilateral oophorectomy secondary to severe dysmenorrhea and uterine fibroids. Patient will be coming in today at 10:30 for script and to start a surgery packet with Rony Thanks.

## 2020-12-16 ENCOUNTER — TELEPHONE (OUTPATIENT)
Dept: OBGYN CLINIC | Age: 35
End: 2020-12-16

## 2020-12-16 NOTE — TELEPHONE ENCOUNTER
Attempted to reach out to patient to get surgery process started. No pa is needed per insurance. Patient voicemail is not set up will try to call patient again.

## 2020-12-17 NOTE — TELEPHONE ENCOUNTER
Spoke to patient she is scheduled for surgery on 3/18/21. Will call patient back when march schedule opens to make preop and post op appointments.

## 2021-01-04 ENCOUNTER — TELEPHONE (OUTPATIENT)
Dept: OBGYN CLINIC | Age: 36
End: 2021-01-04

## 2021-01-04 DIAGNOSIS — R10.2 PELVIC PAIN IN FEMALE: ICD-10-CM

## 2021-01-04 DIAGNOSIS — N92.0 MENORRHAGIA WITH REGULAR CYCLE: ICD-10-CM

## 2021-01-04 DIAGNOSIS — N94.6 DYSMENORRHEA: ICD-10-CM

## 2021-01-04 RX ORDER — HYDROCODONE BITARTRATE AND ACETAMINOPHEN 5; 325 MG/1; MG/1
1 TABLET ORAL EVERY 6 HOURS PRN
Qty: 10 TABLET | Refills: 0 | Status: SHIPPED | OUTPATIENT
Start: 2021-01-04 | End: 2021-01-21 | Stop reason: SDUPTHER

## 2021-01-04 NOTE — TELEPHONE ENCOUNTER
Please advise patient that the current recommendations is that patient quarantine 2 weeks prior to surgery. Ok to see if able to move surgery sooner. Autumn Ricci for refill on RX. Rx printed.

## 2021-01-04 NOTE — TELEPHONE ENCOUNTER
Pt states she has been working from home due to covid. Her job is now asking that she return to office. She was supposed to go back today but did not go to work. She says she has an upcoming surgery and does not want to risk getting covid. ( Scheduled for March 18th)  She is asking that you fill out paperwork that would allow her to remain at home for work. She says if you are ok with this she will request said paperwork. Pt also asking for a refill of hydrocodone. She says she is completely out now and you have been giving her 10 at a time. please advise.

## 2021-01-05 NOTE — TELEPHONE ENCOUNTER
Attempted to call patient. Her voicemail is not setup unable to leave message. Will attempt to call again tomorrow.  Prescription placed in binder at

## 2021-01-20 ENCOUNTER — TELEPHONE (OUTPATIENT)
Dept: OBGYN CLINIC | Age: 36
End: 2021-01-20

## 2021-01-20 DIAGNOSIS — N94.6 DYSMENORRHEA: ICD-10-CM

## 2021-01-20 DIAGNOSIS — N92.0 MENORRHAGIA WITH REGULAR CYCLE: ICD-10-CM

## 2021-01-20 DIAGNOSIS — R10.2 PELVIC PAIN IN FEMALE: ICD-10-CM

## 2021-01-20 NOTE — TELEPHONE ENCOUNTER
Patient called in, in tears, stated she is needing to have her script refilled and is asking if you could print this for her ASAP. Routing to Dr. Boo Rea.

## 2021-01-21 RX ORDER — HYDROCODONE BITARTRATE AND ACETAMINOPHEN 5; 325 MG/1; MG/1
1 TABLET ORAL EVERY 6 HOURS PRN
Qty: 10 TABLET | Refills: 0 | Status: SHIPPED | OUTPATIENT
Start: 2021-01-21 | End: 2021-02-02 | Stop reason: SDUPTHER

## 2021-02-02 ENCOUNTER — TELEPHONE (OUTPATIENT)
Dept: OBGYN CLINIC | Age: 36
End: 2021-02-02

## 2021-02-02 DIAGNOSIS — N94.6 DYSMENORRHEA: ICD-10-CM

## 2021-02-02 DIAGNOSIS — R10.2 PELVIC PAIN IN FEMALE: ICD-10-CM

## 2021-02-02 DIAGNOSIS — N92.0 MENORRHAGIA WITH REGULAR CYCLE: ICD-10-CM

## 2021-02-02 RX ORDER — HYDROCODONE BITARTRATE AND ACETAMINOPHEN 5; 325 MG/1; MG/1
1 TABLET ORAL EVERY 6 HOURS PRN
Qty: 10 TABLET | Refills: 0 | Status: SHIPPED | OUTPATIENT
Start: 2021-02-02 | End: 2021-02-05

## 2021-02-02 NOTE — TELEPHONE ENCOUNTER
Patient calling in and is asking for a refill of the hydrocodone as she is leaving on vacation and will need it while she is gone.  (hydrocodone)    Routing to Dr. Dontrell Otoole

## 2021-02-04 ENCOUNTER — HOSPITAL ENCOUNTER (EMERGENCY)
Age: 36
Discharge: HOME | End: 2021-02-04
Attending: EMERGENCY MEDICINE
Payer: COMMERCIAL

## 2021-02-04 ENCOUNTER — APPOINTMENT (OUTPATIENT)
Dept: CT IMAGING | Age: 36
End: 2021-02-04
Payer: COMMERCIAL

## 2021-02-04 ENCOUNTER — TELEPHONE (OUTPATIENT)
Dept: OBGYN CLINIC | Age: 36
End: 2021-02-04

## 2021-02-04 VITALS
TEMPERATURE: 98.3 F | SYSTOLIC BLOOD PRESSURE: 151 MMHG | OXYGEN SATURATION: 98 % | HEIGHT: 67 IN | HEART RATE: 123 BPM | BODY MASS INDEX: 21.97 KG/M2 | DIASTOLIC BLOOD PRESSURE: 99 MMHG | RESPIRATION RATE: 16 BRPM | WEIGHT: 140 LBS

## 2021-02-04 DIAGNOSIS — N10 ACUTE PYELONEPHRITIS: ICD-10-CM

## 2021-02-04 DIAGNOSIS — N10 ACUTE PYELONEPHRITIS: Primary | ICD-10-CM

## 2021-02-04 LAB
A/G RATIO: 1.5 (ref 1.1–2.2)
ALBUMIN SERPL-MCNC: 4.9 G/DL (ref 3.4–5)
ALP BLD-CCNC: 42 U/L (ref 40–129)
ALT SERPL-CCNC: 6 U/L (ref 10–40)
ANION GAP SERPL CALCULATED.3IONS-SCNC: 12 MMOL/L (ref 3–16)
AST SERPL-CCNC: 15 U/L (ref 15–37)
BACTERIA: ABNORMAL /HPF
BASOPHILS ABSOLUTE: 0.1 K/UL (ref 0–0.2)
BASOPHILS RELATIVE PERCENT: 0.5 %
BILIRUB SERPL-MCNC: 0.3 MG/DL (ref 0–1)
BILIRUBIN URINE: NEGATIVE
BLOOD, URINE: ABNORMAL
BUN BLDV-MCNC: 16 MG/DL (ref 7–20)
CALCIUM SERPL-MCNC: 9.5 MG/DL (ref 8.3–10.6)
CHLORIDE BLD-SCNC: 98 MMOL/L (ref 99–110)
CLARITY: CLEAR
CO2: 27 MMOL/L (ref 21–32)
COLOR: ABNORMAL
CREAT SERPL-MCNC: 0.7 MG/DL (ref 0.6–1.1)
EOSINOPHILS ABSOLUTE: 0.1 K/UL (ref 0–0.6)
EOSINOPHILS RELATIVE PERCENT: 0.6 %
EPITHELIAL CELLS, UA: ABNORMAL /HPF (ref 0–5)
GFR AFRICAN AMERICAN: >60
GFR NON-AFRICAN AMERICAN: >60
GLOBULIN: 3.3 G/DL
GLUCOSE BLD-MCNC: 98 MG/DL (ref 70–99)
GLUCOSE URINE: NEGATIVE MG/DL
GONADOTROPIN, CHORIONIC (HCG) QUANT: <5 MIU/ML
HCT VFR BLD CALC: 42.2 % (ref 36–48)
HEMOGLOBIN: 13.9 G/DL (ref 12–16)
KETONES, URINE: ABNORMAL MG/DL
LEUKOCYTE ESTERASE, URINE: NEGATIVE
LIPASE: 29 U/L (ref 13–60)
LYMPHOCYTES ABSOLUTE: 3.3 K/UL (ref 1–5.1)
LYMPHOCYTES RELATIVE PERCENT: 21.2 %
MCH RBC QN AUTO: 29.7 PG (ref 26–34)
MCHC RBC AUTO-ENTMCNC: 32.9 G/DL (ref 31–36)
MCV RBC AUTO: 90.3 FL (ref 80–100)
MICROSCOPIC EXAMINATION: YES
MONOCYTES ABSOLUTE: 1.1 K/UL (ref 0–1.3)
MONOCYTES RELATIVE PERCENT: 6.9 %
NEUTROPHILS ABSOLUTE: 10.9 K/UL (ref 1.7–7.7)
NEUTROPHILS RELATIVE PERCENT: 70.8 %
NITRITE, URINE: NEGATIVE
PDW BLD-RTO: 13.3 % (ref 12.4–15.4)
PH UA: 6 (ref 5–8)
PLATELET # BLD: 512 K/UL (ref 135–450)
PMV BLD AUTO: 7.4 FL (ref 5–10.5)
POTASSIUM REFLEX MAGNESIUM: 3.7 MMOL/L (ref 3.5–5.1)
PROTEIN UA: ABNORMAL MG/DL
RBC # BLD: 4.68 M/UL (ref 4–5.2)
RBC UA: ABNORMAL /HPF (ref 0–4)
SODIUM BLD-SCNC: 137 MMOL/L (ref 136–145)
SPECIFIC GRAVITY UA: 1.02 (ref 1–1.03)
TOTAL PROTEIN: 8.2 G/DL (ref 6.4–8.2)
URINE REFLEX TO CULTURE: YES
URINE TYPE: ABNORMAL
UROBILINOGEN, URINE: 0.2 E.U./DL
WBC # BLD: 15.3 K/UL (ref 4–11)
WBC UA: ABNORMAL /HPF (ref 0–5)

## 2021-02-04 PROCEDURE — 83690 ASSAY OF LIPASE: CPT

## 2021-02-04 PROCEDURE — 85025 COMPLETE CBC W/AUTO DIFF WBC: CPT

## 2021-02-04 PROCEDURE — 80053 COMPREHEN METABOLIC PANEL: CPT

## 2021-02-04 PROCEDURE — 6370000000 HC RX 637 (ALT 250 FOR IP): Performed by: NURSE PRACTITIONER

## 2021-02-04 PROCEDURE — 74176 CT ABD & PELVIS W/O CONTRAST: CPT

## 2021-02-04 PROCEDURE — 93005 ELECTROCARDIOGRAM TRACING: CPT | Performed by: NURSE PRACTITIONER

## 2021-02-04 PROCEDURE — 2580000003 HC RX 258: Performed by: NURSE PRACTITIONER

## 2021-02-04 PROCEDURE — 87086 URINE CULTURE/COLONY COUNT: CPT

## 2021-02-04 PROCEDURE — 96375 TX/PRO/DX INJ NEW DRUG ADDON: CPT

## 2021-02-04 PROCEDURE — 96374 THER/PROPH/DIAG INJ IV PUSH: CPT

## 2021-02-04 PROCEDURE — 84702 CHORIONIC GONADOTROPIN TEST: CPT

## 2021-02-04 PROCEDURE — 81001 URINALYSIS AUTO W/SCOPE: CPT

## 2021-02-04 PROCEDURE — 6360000002 HC RX W HCPCS: Performed by: NURSE PRACTITIONER

## 2021-02-04 PROCEDURE — 99284 EMERGENCY DEPT VISIT MOD MDM: CPT

## 2021-02-04 RX ORDER — ATENOLOL 25 MG/1
50 TABLET ORAL NIGHTLY
COMMUNITY

## 2021-02-04 RX ORDER — ONDANSETRON 2 MG/ML
4 INJECTION INTRAMUSCULAR; INTRAVENOUS ONCE
Status: COMPLETED | OUTPATIENT
Start: 2021-02-04 | End: 2021-02-04

## 2021-02-04 RX ORDER — 0.9 % SODIUM CHLORIDE 0.9 %
1000 INTRAVENOUS SOLUTION INTRAVENOUS ONCE
Status: COMPLETED | OUTPATIENT
Start: 2021-02-04 | End: 2021-02-04

## 2021-02-04 RX ORDER — CEPHALEXIN 250 MG/1
500 CAPSULE ORAL ONCE
Status: COMPLETED | OUTPATIENT
Start: 2021-02-04 | End: 2021-02-04

## 2021-02-04 RX ORDER — SULFAMETHOXAZOLE AND TRIMETHOPRIM 800; 160 MG/1; MG/1
1 TABLET ORAL 2 TIMES DAILY
Qty: 28 TABLET | Refills: 0 | Status: SHIPPED | OUTPATIENT
Start: 2021-02-04 | End: 2021-02-18

## 2021-02-04 RX ORDER — DULOXETIN HYDROCHLORIDE 30 MG/1
30 CAPSULE, DELAYED RELEASE ORAL NIGHTLY
COMMUNITY

## 2021-02-04 RX ORDER — OXYCODONE HYDROCHLORIDE 5 MG/1
5 TABLET ORAL EVERY 6 HOURS PRN
Qty: 12 TABLET | Refills: 0 | Status: SHIPPED | OUTPATIENT
Start: 2021-02-04 | End: 2021-02-05 | Stop reason: SDUPTHER

## 2021-02-04 RX ORDER — CEPHALEXIN 500 MG/1
500 CAPSULE ORAL 4 TIMES DAILY
Qty: 28 CAPSULE | Refills: 0 | Status: SHIPPED | OUTPATIENT
Start: 2021-02-04 | End: 2021-02-04

## 2021-02-04 RX ORDER — MORPHINE SULFATE 4 MG/ML
4 INJECTION, SOLUTION INTRAMUSCULAR; INTRAVENOUS ONCE
Status: COMPLETED | OUTPATIENT
Start: 2021-02-04 | End: 2021-02-04

## 2021-02-04 RX ADMIN — MORPHINE SULFATE 4 MG: 4 INJECTION, SOLUTION INTRAMUSCULAR; INTRAVENOUS at 17:49

## 2021-02-04 RX ADMIN — ONDANSETRON 4 MG: 2 INJECTION INTRAMUSCULAR; INTRAVENOUS at 17:49

## 2021-02-04 RX ADMIN — SODIUM CHLORIDE 1000 ML: 9 INJECTION, SOLUTION INTRAVENOUS at 17:49

## 2021-02-04 RX ADMIN — CEPHALEXIN 500 MG: 250 CAPSULE ORAL at 19:53

## 2021-02-04 ASSESSMENT — PAIN SCALES - GENERAL
PAINLEVEL_OUTOF10: 8
PAINLEVEL_OUTOF10: 8

## 2021-02-04 ASSESSMENT — PAIN DESCRIPTION - LOCATION
LOCATION: BACK
LOCATION: ABDOMEN

## 2021-02-04 NOTE — ED PROVIDER NOTES
[facility] Emergency Department    CHIEF COMPLAINT  Abdominal Pain (pt with hx of kidney stones and went to urgent care and had blood in urine)      SHARED SERVICE VISIT  I have seen and evaluated this patient with my supervising physician, Dr. Gloria Brunson. HISTORY OF PRESENT ILLNESS  Lenny Killian is a 28 y.o. female with a history of kidney stones x2, both which passed on their own, who presents to the ED complaining of a 2-day history of \"stabbing\" 7/10 right flank pain without radiation of the pain into her abdomen. She denies nausea, vomiting, dizziness, fever, chills, sweats, urinary symptoms, change in color of urine, abdominal pain, or other complaints. LMP 1/4/2021. No other complaints, modifying factors or associated symptoms. Nursing notes reviewed.    Past Medical History:   Diagnosis Date    Diabetes mellitus ([facility] [address] 2017    Elevated blood pressure complicating pregnancy, antepartum 3/6/2014    Hypertension     Proteinuria in pregnancy, antepartum 3/23/2014    Septal defect     spontaneous closure     Past Surgical History:   Procedure Laterality Date    CYST REMOVAL Left     eye    TONSILLECTOMY AND ADENOIDECTOMY  1995    WISDOM TOOTH EXTRACTION  2007     Family History   Problem Relation Age of Onset    High Blood Pressure Mother     Diabetes Mother     Cancer Father     High Blood Pressure Father     Diabetes Father     Lung Cancer Maternal Grandmother     Other Maternal Grandfather         Sclerosis     Premature Birth Son      Social History     Socioeconomic History    Marital status:      Spouse name: Andrew Urbina Number of children: 1    Years of education: 13    Highest education level: Not on file   Occupational History    Occupation: Accounts payable     Comment: [employer]   Social Needs    Financial resource strain: Not on file    Food insecurity     Worry: Not on file     Inability: Not on file   Fyreball needs     Medical: Not oriented. CN's 2-12 intact. No gross facial drooping. Strength 5/5, sensation intact. PSYCHIATRIC: Normal mood and affect. RADIOLOGY  No results found. ED COURSE  Patient received morphine for pain, with good relief. Triage vitals stable but hypertensive at 160/108 mmHg. A abdominal/renal and cardiac workup was initiated including urine reflex to culture, microscopic urinalysis, CBC, CMP, lipase, hCG qualitative, EKG, and CT abdomen pelvis. Interventions: Patient was placed on cardiac monitoring and given a liter bolus of normal saline, Zofran for nausea, morphine for pain and started on Keflex 500 mg p.o.         Labs Ordered:  I have reviewed and interpreted all of the currently available lab results from this visit:  Results for orders placed or performed during the hospital encounter of 02/04/21   Culture, Urine    Specimen: Urine, clean catch   Result Value Ref Range    Urine Culture, Routine No growth at 18 to 36 hours    CBC auto differential   Result Value Ref Range    WBC 15.3 (H) 4.0 - 11.0 K/uL    RBC 4.68 4.00 - 5.20 M/uL    Hemoglobin 13.9 12.0 - 16.0 g/dL    Hematocrit 42.2 36.0 - 48.0 %    MCV 90.3 80.0 - 100.0 fL    MCH 29.7 26.0 - 34.0 pg    MCHC 32.9 31.0 - 36.0 g/dL    RDW 13.3 12.4 - 15.4 %    Platelets 228 (H) 402 - 450 K/uL    MPV 7.4 5.0 - 10.5 fL    Neutrophils % 70.8 %    Lymphocytes % 21.2 %    Monocytes % 6.9 %    Eosinophils % 0.6 %    Basophils % 0.5 %    Neutrophils Absolute 10.9 (H) 1.7 - 7.7 K/uL    Lymphocytes Absolute 3.3 1.0 - 5.1 K/uL    Monocytes Absolute 1.1 0.0 - 1.3 K/uL    Eosinophils Absolute 0.1 0.0 - 0.6 K/uL    Basophils Absolute 0.1 0.0 - 0.2 K/uL   Comprehensive Metabolic Panel w/ Reflex to MG   Result Value Ref Range    Sodium 137 136 - 145 mmol/L    Potassium reflex Magnesium 3.7 3.5 - 5.1 mmol/L    Chloride 98 (L) 99 - 110 mmol/L    CO2 27 21 - 32 mmol/L    Anion Gap 12 3 - 16    Glucose 98 70 - 99 mg/dL    BUN 16 7 - 20 mg/dL    CREATININE 0.7 0.6 - 1.1 mg/dL    GFR Non-African American >60 >60    GFR African American >60 >60    Calcium 9.5 8.3 - 10.6 mg/dL    Total Protein 8.2 6.4 - 8.2 g/dL    Albumin 4.9 3.4 - 5.0 g/dL    Albumin/Globulin Ratio 1.5 1.1 - 2.2    Total Bilirubin 0.3 0.0 - 1.0 mg/dL    Alkaline Phosphatase 42 40 - 129 U/L    ALT 6 (L) 10 - 40 U/L    AST 15 15 - 37 U/L    Globulin 3.3 g/dL   Lipase   Result Value Ref Range    Lipase 29.0 13.0 - 60.0 U/L   HCG, Quantitative, Pregnancy   Result Value Ref Range    hCG Quant <5.0 <5.0 mIU/mL   Urinalysis Reflex to Culture    Specimen: Urine, clean catch   Result Value Ref Range    Color, UA Straw Straw/Yellow    Clarity, UA Clear Clear    Glucose, Ur Negative Negative mg/dL    Bilirubin Urine Negative Negative    Ketones, Urine TRACE (A) Negative mg/dL    Specific Gravity, UA 1.020 1.005 - 1.030    Blood, Urine TRACE-INTACT (A) Negative    pH, UA 6.0 5.0 - 8.0    Protein, UA TRACE (A) Negative mg/dL    Urobilinogen, Urine 0.2 <2.0 E.U./dL    Nitrite, Urine Negative Negative    Leukocyte Esterase, Urine Negative Negative    Microscopic Examination YES     Urine Type NotGiven     Urine Reflex to Culture Yes    Microscopic Urinalysis   Result Value Ref Range    WBC, UA 10-20 (A) 0 - 5 /HPF    RBC, UA 3-4 0 - 4 /HPF    Epithelial Cells, UA 11-20 (A) 0 - 5 /HPF    Bacteria, UA 2+ (A) None Seen /HPF   EKG 12 Lead   Result Value Ref Range    Ventricular Rate 104 BPM    Atrial Rate 104 BPM    P-R Interval 142 ms    QRS Duration 78 ms    Q-T Interval 338 ms    QTc Calculation (Bazett) 444 ms    P Axis 62 degrees    R Axis 10 degrees    T Axis 33 degrees    Diagnosis       Sinus tachycardiaOtherwise normal ECGNo previous ECGs availableConfirmed by Anthony Antunez MD, Jenni Rajan ([id number]) on 2/5/2021 8:15:56 AM           Imaging ordered:  Ct Abdomen Pelvis Wo Contrast Additional Contrast? None    Result Date: 2/4/2021  Small air-fluid levels throughout the bowel which is mildly dilated and predominantly involving the small bowel. This could be due to gastroenteritis or possible developing early ileus. Recommend clinical follow-up Small renal stones bilaterally with no hydronephrosis or urinary obstruction Mild constipation. 3 cm partially calcified exophytic fibroid along the uterus posteriorly with no adnexal mass or free fluid. Reevaluation:  Patient has not been resting with eyes open and is easy. Patient reports good improvement of her pain. A discussion was had with Mrs. Cornejo regarding acute pyelonephritis. Risk management discussed and shared decision making had with patient and/or surrogate. All questions were answered. Patient will follow up with her PCP for further evaluation/treatment in 1-2 days. All questions answered. Patient is agreeable with the plan for discharge. Patient was sent home with a prescription for Bactrim DS. CRITICAL CARE TIME  0 Minutes of critical care time spent not including separately billable procedures. MDM  Patient presents to the emergency department with acute pyelonephritis. Alternate diagnoses are less likely based on history and physical. Considered appendicitis, bowel obstruction, diverticulitis, gastroenteritis, among others but less likely based on history and physical.    My attending physician and I feel that the patient is safe and appropriate for discharge to home with outpatient follow-up with PCP in the next 1-2 days as there is low suspicion for appendicitis, bowel obstruction, diverticulitis, and gastroenteritis. Patient will be treated with Bactrim -160/tab. take 1 tab by mouth 2 times daily for 14 days and is instructed to return to the emergency department immediately for new or worsening symptoms including, but not limited to increased flank pain, inability to tolerate food and drink, fever, chills, sweats, inability to urinate, or other complaints. .  Patient verbalizes understanding and is agreeable with plan for discharge and

## 2021-02-04 NOTE — TELEPHONE ENCOUNTER
Pt called office asking to speak with Dr Paula Awan directly. Advised she was not in office. Pt states she just left Urgent care and is upset that she was told to go the Hospital and she does not want to go. She is in \" lots of pain\" Pt does not want to discuss the issue. She is tearful and asking if Dr Paula Awan can please call her.

## 2021-02-05 LAB
EKG ATRIAL RATE: 104 BPM
EKG DIAGNOSIS: NORMAL
EKG P AXIS: 62 DEGREES
EKG P-R INTERVAL: 142 MS
EKG Q-T INTERVAL: 338 MS
EKG QRS DURATION: 78 MS
EKG QTC CALCULATION (BAZETT): 444 MS
EKG R AXIS: 10 DEGREES
EKG T AXIS: 33 DEGREES
EKG VENTRICULAR RATE: 104 BPM
URINE CULTURE, ROUTINE: NORMAL

## 2021-02-05 PROCEDURE — 93010 ELECTROCARDIOGRAM REPORT: CPT | Performed by: INTERNAL MEDICINE

## 2021-02-05 RX ORDER — OXYCODONE HYDROCHLORIDE 5 MG/1
5 TABLET ORAL EVERY 6 HOURS PRN
Qty: 10 TABLET | Refills: 0 | Status: SHIPPED | OUTPATIENT
Start: 2021-02-08 | End: 2021-02-18 | Stop reason: SDUPTHER

## 2021-02-05 NOTE — ED PROVIDER NOTES
I independently performed a history and physical on [facility]. All diagnostic, treatment, and disposition decisions were made by myself in conjunction with the advanced practice provider. Briefly, this is a 28 y.o. female here for right-sided flank pain. Thinks it is due to kidney stone which she has had twice in the past.  No fevers or chills. No nausea or vomiting. No chest pain or shortness of breath. Pain is moderate intensity. Tried ibuprofen without improvement. No clear exacerbating remitting factors. Was sent by urgent care. .    On exam,   General: Patient is in no acute distress  Skin: No cyanosis  HEENT: Moist mucous membranes  Heart: Regular rate, regular rhythm  Lung: No respiratory distress  Abdomen: Soft, nontender  Neuro: Moving all extremities, no facial droop, no slurred speech, answers questions appropriately      Screenings   Cm Coma Scale  Eye Opening: Spontaneous  Best Verbal Response: Oriented  Best Motor Response: Obeys commands  [place] Coma Scale Score: 15        MDM  Patient is a 61-year-old woman who presents with right-sided flank pain. Tachycardic, likely secondary to pain. Obtain CT abdomen pelvis noncontrast to evaluate for kidney stone which did not show any obstructive ureteric calculus. Does show possible urinary tract infection and in that setting, likely has right-sided pyelonephritis. She is tolerating oral intake without issue and pain is under control and she is young without multiple medical comorbidities therefore I do believe that she can trial outpatient antibiotics to which she is in agreement. Has leukocytosis which is likely secondary to this. CT also shows the possible gastroenteritis versus developing ileus however she is having bowel movements without issue. Patient agrees to follow-up with her doctor this week.   All questions answered    Patient Referrals:  Gabriela Gama [id number]  [phone]    Call in 1 day      [facility]  ED  [address] [facility] [address]  Go to   As needed    [facility]  ED  [address] [facility] [address]  [phone]          Discharge Medications:  Discharge Medication List as of 2/4/2021  8:40 PM      START taking these medications    Details   sulfamethoxazole-trimethoprim (BACTRIM DS) 800-160 MG per tablet Take 1 tablet by mouth 2 times daily for 14 days, Disp-28 tablet, R-0Normal      cephALEXin (KEFLEX) 500 MG capsule Take 1 capsule by mouth 4 times daily for 7 days, Disp-28 capsule, R-0Normal             FINAL IMPRESSION  1. Acute pyelonephritis        Blood pressure (!) 151/99, pulse 123, temperature 98.3 °F (36.8 °C), temperature source Oral, resp. rate 16, height 5' 7\" (1.702 m), weight 140 lb (63.5 kg), last menstrual period 01/06/2021, SpO2 98 %, not currently breastfeeding. For further details of [facility] emergency department encounter, please see documentation by advanced practice provider, Tam Watson MD  02/04/21 6245

## 2021-02-08 NOTE — TELEPHONE ENCOUNTER
Pt called and is aware prescription is available for  with start of next cycle. She states she is about to start  and will be in to  today.

## 2021-02-15 ENCOUNTER — TELEPHONE (OUTPATIENT)
Dept: OBGYN CLINIC | Age: 36
End: 2021-02-15

## 2021-02-18 ENCOUNTER — APPOINTMENT (OUTPATIENT)
Dept: GENERAL RADIOLOGY | Age: 36
End: 2021-02-18
Payer: COMMERCIAL

## 2021-02-18 ENCOUNTER — HOSPITAL ENCOUNTER (EMERGENCY)
Age: 36
Discharge: HOME | End: 2021-02-18
Payer: COMMERCIAL

## 2021-02-18 ENCOUNTER — TELEPHONE (OUTPATIENT)
Dept: OBGYN CLINIC | Age: 36
End: 2021-02-18

## 2021-02-18 VITALS
OXYGEN SATURATION: 99 % | HEIGHT: 67 IN | WEIGHT: 140 LBS | HEART RATE: 97 BPM | TEMPERATURE: 98.2 F | SYSTOLIC BLOOD PRESSURE: 165 MMHG | BODY MASS INDEX: 21.97 KG/M2 | RESPIRATION RATE: 18 BRPM | DIASTOLIC BLOOD PRESSURE: 100 MMHG

## 2021-02-18 DIAGNOSIS — N10 ACUTE PYELONEPHRITIS: ICD-10-CM

## 2021-02-18 DIAGNOSIS — S40.011A CONTUSION OF RIGHT SHOULDER, INITIAL ENCOUNTER: ICD-10-CM

## 2021-02-18 DIAGNOSIS — S16.1XXA ACUTE STRAIN OF NECK MUSCLE, INITIAL ENCOUNTER: ICD-10-CM
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
## 2021-02-18 PROCEDURE — 99283 EMERGENCY DEPT VISIT LOW MDM: CPT

## 2021-02-18 PROCEDURE — 96372 THER/PROPH/DIAG INJ SC/IM: CPT

## 2021-02-18 PROCEDURE — 73030 X-RAY EXAM OF SHOULDER: CPT

## 2021-02-18 PROCEDURE — 6360000002 HC RX W HCPCS: Performed by: PHYSICIAN ASSISTANT

## 2021-02-18 RX ORDER — KETOROLAC TROMETHAMINE 30 MG/ML
30 INJECTION, SOLUTION INTRAMUSCULAR; INTRAVENOUS ONCE
Status: COMPLETED | OUTPATIENT
Start: 2021-02-18 | End: 2021-02-18

## 2021-02-18 RX ORDER — OXYCODONE HYDROCHLORIDE 5 MG/1
5 TABLET ORAL EVERY 6 HOURS PRN
Qty: 20 TABLET | Refills: 0 | Status: SHIPPED | OUTPATIENT
Start: 2021-02-18 | End: 2021-03-08 | Stop reason: SDUPTHER

## 2021-02-18 RX ORDER — KETOROLAC TROMETHAMINE 10 MG/1
10 TABLET, FILM COATED ORAL EVERY 6 HOURS PRN
Qty: 20 TABLET | Refills: 0 | Status: SHIPPED | OUTPATIENT
Start: 2021-02-18 | End: 2021-03-08

## 2021-02-18 RX ORDER — CYCLOBENZAPRINE HCL 10 MG
10 TABLET ORAL 3 TIMES DAILY PRN
Qty: 15 TABLET | Refills: 0 | Status: SHIPPED | OUTPATIENT
Start: 2021-02-18 | End: 2021-03-08

## 2021-02-18 RX ADMIN — KETOROLAC TROMETHAMINE 30 MG: 30 INJECTION, SOLUTION INTRAMUSCULAR at 15:15

## 2021-02-18 ASSESSMENT — ENCOUNTER SYMPTOMS
NAUSEA: 0
EYES NEGATIVE: 1
VOMITING: 0
BACK PAIN: 1
COUGH: 0
ABDOMINAL PAIN: 0
COLOR CHANGE: 0
SHORTNESS OF BREATH: 0

## 2021-02-18 ASSESSMENT — PAIN SCALES - GENERAL: PAINLEVEL_OUTOF10: 6

## 2021-02-18 NOTE — TELEPHONE ENCOUNTER
Pt calling to request to speak with Dr Светлана Blanc. She says she \" is in so much pain\" She says she needs to know what the plan will be until her surgery because she is hurting. Pt states she is about to go back to the ED if you are unable to return the call.  Please advise

## 2021-02-18 NOTE — ED PROVIDER NOTES
[address] [facility]  ED  EMERGENCY DEPARTMENT ENCOUNTER        Pt Name: Palomo Rudolph  MRN: [id number]  Armstrongfal[place] 1985  Date of evaluation: 2/18/2021  Provider: Bahman Ross PA-C  PCP: Terence Kendall  ED Attending: Baruch Riedel, MD      This patient was not seen by the attending provider     History provided by the patient     CHIEF COMPLAINT:     Chief Complaint   Patient presents with    Shoulder Pain     FALL    Neck Pain    Back Pain     kidneystones       HISTORY OF PRESENT ILLNESS:      Palomo Rudolph is a 28 y.o. female who arrives to the ED by private vehicle complaining of pain status post fall. Patient reports falling outside of her home yesterday at approximately 8:30 PM.  She states she had just walked down the steps and slipped on the icy sidewalk. She fell onto her right side and reports right shoulder pain as well as pain to her right upper back and right neck. She did not hit her head. She did not lose consciousness. She rates her pain 7/10. She has been taking ibuprofen for this pain. In addition to the pain from falling patient reports bilateral flank pain that she attributes to kidney stones. She was seen in the ED on 2/4. She was diagnosed with pyelonephritis at that time. She did have CT imaging at that time that showed nephrolithiasis but no ureteral calculi. She was prescribed oxycodone 5 mg at the time of that visit. Patient has exacerbated right arm and neck pain with movement of the right shoulder joint and upon turning her head. No significant alleviating factors identified. Nursing Notes were reviewed     REVIEW OF SYSTEMS:     Review of Systems   Constitutional: Negative for appetite change, chills and fever. HENT: Negative. Eyes: Negative. Respiratory: Negative for cough and shortness of breath. Cardiovascular: Negative for chest pain. Gastrointestinal: Negative for abdominal pain, nausea and vomiting.    Genitourinary: Positive for flank pain. Negative for dysuria and hematuria. Musculoskeletal: Positive for arthralgias (right shoulder), back pain and neck pain. Negative for gait problem. Skin: Negative for color change. Neurological: Negative for dizziness and headaches. All other systems reviewed and are negative. Except as noted above in the ROS, all other systems were reviewed and negative.          PAST MEDICAL HISTORY:     Past Medical History:   Diagnosis Date    Diabetes mellitus ([facility] [address] 2017    Elevated blood pressure complicating pregnancy, antepartum 3/6/2014    Hypertension     Proteinuria in pregnancy, antepartum 3/23/2014    Septal defect     spontaneous closure         SURGICAL HISTORY:      Past Surgical History:   Procedure Laterality Date    CYST REMOVAL Left     eye    TONSILLECTOMY AND ADENOIDECTOMY  1995    WISDOM TOOTH EXTRACTION  2007         CURRENT MEDICATIONS:       Discharge Medication List as of 2/18/2021  4:06 PM      CONTINUE these medications which have NOT CHANGED    Details   DULoxetine (CYMBALTA) 30 MG extended release capsule Take 30 mg by mouth dailyHistorical Med      atenolol (TENORMIN) 25 MG tablet Take 25 mg by mouth nightlyHistorical Med      sulfamethoxazole-trimethoprim (BACTRIM DS) 800-160 MG per tablet Take 1 tablet by mouth 2 times daily for 14 days, Disp-28 tablet, R-0Normal      Norgestim-Eth Estrad Triphasic (TRI-SPRINTEC) 0.18/0.215/0.25 MG-35 MCG TABS Take 1 tablet by mouth daily, Disp-28 tablet,R-3Normal      norethindrone-ethinyl estradiol-iron (LOESTRIN FE 1.5/30) 1.5-30 MG-MCG tablet Take 1 tablet by mouth daily, Disp-3 packet, R-3Normal      ibuprofen (ADVIL;MOTRIN) 800 MG tablet Take 1 tablet by mouth every 8 hours as needed for Pain, Disp-40 tablet, R-0Print      glucose monitoring kit (FREESTYLE) monitoring kit SEE ADMIN INSTRUCTIONS Starting 8/8/2017, Until Discontinued, Disp-1 kit, R-0, [facility] blood sugar readings fasting, pre-meals, 1 hour postprandial and at bedtime      glucose blood VI test strips (ASCENSIA AUTODISC VI;ONE TOUCH ULTRA TEST VI) strip SEE ADMIN INSTRUCTIONS Starting 8/8/2017, Until Discontinued, Disp-100 each, R-3, NormalCheck blood sugars fasting, pre-meals, 1 hour postprandial and at bedtime      [employer] LANCETS MISC SEE ADMIN INSTRUCTIONS Starting 8/8/2017, Until Discontinued, Disp-100 each, R-3, Normalfasting, pre-meals, 1 hour postprandial and at bedtime      Alcohol Swabs (ALCOHOL PREP) 70 % PADS SEE ADMIN INSTRUCTIONS Starting 8/8/2017, Until Discontinued, Disp-100 each, R-3, NormalCheck blood sugars fasting, pre-meals, 1 hour postprandial and at bedtime               ALLERGIES:    Patient has no known allergies.     FAMILY HISTORY:       Family History   Problem Relation Age of Onset    High Blood Pressure Mother     Diabetes Mother     Cancer Father     High Blood Pressure Father     Diabetes Father     Lung Cancer Maternal Grandmother     Other Maternal Grandfather         Sclerosis     Premature Birth Son           SOCIAL HISTORY:       Social History     Socioeconomic History    Marital status:      Spouse name: Lisa Bowles Number of children: 1    Years of education: 13    Highest education level: None   Occupational History    Occupation: Accounts payable     Comment: [employer]   Social Needs    Financial resource strain: None    Food insecurity     Worry: None     Inability: None    Transportation needs     Medical: None     Non-medical: None   Tobacco Use    Smoking status: Never Smoker    Smokeless tobacco: Never Used   Substance and Sexual Activity    Alcohol use: No     Alcohol/week: 5.0 standard drinks     Types: 2 Glasses of wine, 2 Cans of beer, 1 Shots of liquor per week     Comment: Not since pregnancy    Drug use: No    Sexual activity: Yes     Partners: Male     Birth control/protection: Pill     Comment: OCP   Lifestyle    Physical activity     Days per week: None     Minutes per session: None    Stress: None   Relationships    Social connections     Talks on phone: None     Gets together: None     Attends Christianity service: None     Active member of club or organization: None     Attends meetings of clubs or organizations: None     Relationship status: None    Intimate partner violence     Fear of current or ex partner: None     Emotionally abused: None     Physically abused: None     Forced sexual activity: None   Other Topics Concern    None   Social History Narrative    None       SCREENINGS:             PHYSICAL EXAM:       ED Triage Vitals [02/18/21 1426]   BP Temp Temp Source Pulse Resp SpO2 Height Weight   (!) 173/112 98.2 °F (36.8 °C) Oral 130 18 98 % 5' 7\" (1.702 m) 140 lb (63.5 kg)       Physical Exam    CONSTITUTIONAL: Awake and alert. Cooperative. Well-developed. Well-nourished. Non-toxic. No acute distress. HENT: Normocephalic. Atraumatic. External ears normal, without discharge. No nasal discharge. Oropharynx clear. Mucous membranes moist.  EYES: Conjunctiva non-injected. No scleral icterus. PERRL. EOM's grossly intact. NECK: Supple. Normal ROM. Tenderness to the right paravertebral muscles of the C-spine. No midline spinous process tenderness. No step-off. No crepitus. CARDIOVASCULAR: RRR. No Murmer. Intact distal pulses. PULMONARY/CHEST WALL: Effort normal. No tachypnea. Lungs clear to ausculation. ABDOMEN: Normal BS. Soft. Nondistended. No tenderness to palpate. No guarding. /ANORECTAL: Not assessed  MUSKULOSKELETAL: Right upper extremity:  No acute deformities. No notable soft tissue swelling. Tenderness to the proximal humerus and shoulder joint. No crepitus. Limited range of motion of right shoulder secondary to pain. No tenderness distally with elbow or wrist and normal range of motion of these joints. Back: Tenderness to the right trapezius musculature and posterior to the right shoulder. Again no midline spinous process tenderness to the thoracic spine no edema.  No tenderness to palpate. SKIN: Warm and dry. No rash. NEUROLOGICAL: Alert and oriented x 3. GCS 15. CN II-XII grossly intact. Strength is 5/5 in all extremities and sensation is intact. Normal gait. PSYCHIATRIC: Normal affect        DIAGNOSTIC RESULTS:         Xr Shoulder Right (min 2 Views)    Result Date: 2/18/2021  EXAMINATION: THREE XRAY VIEWS OF THE RIGHT SHOULDER 2/18/2021 3:09 pm COMPARISON: None. HISTORY: ORDERING SYSTEM PROVIDED HISTORY: pain s/p fall TECHNOLOGIST PROVIDED HISTORY: Reason for exam:->pain s/p fall FINDINGS: There is no acute fracture or dislocation. The bones are normally mineralized. There are no bony destructive lesions. The joint spaces are maintained. 1. No acute abnormality. PROCEDURES:   N/A    CRITICAL CARE TIME:       None      CONSULTS:  None      EMERGENCY DEPARTMENT COURSE and DIFFERENTIAL DIAGNOSIS/MDM:   Vitals:    Vitals:    02/18/21 1426 02/18/21 1546   BP: (!) 173/112 (!) 165/100   Pulse: 130 97   Resp: 18 18   Temp: 98.2 °F (36.8 °C)    TempSrc: Oral    SpO2: 98% 99%   Weight: 140 lb (63.5 kg)    Height: 5' 7\" (1.702 m)        Patient was given the following medications:  Medications   ketorolac (TORADOL) injection 30 mg (30 mg Intramuscular Given 2/18/21 1515)         I have evaluated this patient in the ED. Old records were reviewed. Patient slipped and fell on ice outside of her home yesterday evening. She reports right shoulder and neck pain. X-ray of the right shoulder is negative. Her overall clinical picture and physical exam is consistent with musculoskeletal pain and muscle strain. No fracture identified. No indication for further imaging in regards to her injury from falling. She reports bilateral flank pain and concern for kidney stones. However is not having any dysuria or hematuria. She provides a sample of urine that is clear and faintly yellow but reports she does not think it needs to be checked.   The patient is given Toradol IM in the ED. She drove herself and was not giving anything further while here but will be provided with prescriptions for Toradol and Flexeril upon discharge. She can follow with primary care and with urology in regards to her kidney stone concerns. I estimate there is LOW risk for ACUTE FRACTURE, COMPARTMENT SYNDROME, DEEP VENOUS THROMBOSIS, SEPTIC ARTHRITIS, TENDON OR NEUROVASCULAR INJURY, thus I consider the discharge disposition reasonable. Lynda Gentilered and I have discussed the diagnosis and risks, and we agree with discharging home to follow-up with their primary doctor or the referral orthopedist. We also discussed returning to the Emergency Department immediately if new or worsening symptoms occur. We have discussed the symptoms which are most concerning (e.g., changing or worsening pain, numbness, weakness) that necessitate immediate return. FINAL IMPRESSION:      1. Fall due to slipping on ice or snow, initial encounter    2. Acute strain of neck muscle, initial encounter    3. Contusion of right shoulder, initial encounter          DISPOSITION/PLAN:   DISPOSITION Decision To Discharge      PATIENT REFERRED TO:  Osorio Martinez [place] [id number]            DISCHARGE MEDICATIONS:  Discharge Medication List as of 2/18/2021  4:06 PM      START taking these medications    Details   ketorolac (TORADOL) 10 MG tablet Take 1 tablet by mouth every 6 hours as needed for Pain, Disp-20 tablet, R-0Print      cyclobenzaprine (FLEXERIL) 10 MG tablet Take 1 tablet by mouth 3 times daily as needed for Muscle spasms, Disp-15 tablet, R-0Print      oxyCODONE (ROXICODONE) 5 MG immediate release tablet Take 1 tablet by mouth every 6 hours as needed for Pain for up to 20 doses. Intended supply: 3 days.  Take lowest dose possible to manage pain, Disp-20 tablet, R-0Print                        (Please note thatportions of this note were completed with a voice recognition program. Efforts were made to edit the dictations, but occasionally words are mis-transcribed.)    Elizabeth Lechuga PA-C (electronicallysigned)              Jaz Morales  02/18/21 1687

## 2021-02-18 NOTE — ED NOTES
Pt ambulating to restroom with unassisted steady gait. Will collect a urine sample.       Rosaura Arciniega RN  02/18/21 0370

## 2021-02-18 NOTE — ED NOTES
Pt verbalized understanding of discharge instructions. Pt ambulated out of ED with unassisted steady gait.      Teresa Crisostomo RN  02/18/21 [id number]

## 2021-03-03 ENCOUNTER — TELEPHONE (OUTPATIENT)
Dept: OBGYN CLINIC | Age: 36
End: 2021-03-03

## 2021-03-03 NOTE — TELEPHONE ENCOUNTER
Spoke with pt. Advised that Physician is unable to provide any further narcotics until after completion of surgery. Pt advised to utilize Tylenol, motrin, heat packs as needed. Pt verbalized understanding.

## 2021-03-03 NOTE — TELEPHONE ENCOUNTER
Patient called in tearful, stated she has not slept in days, she is tired, does not feel good and is asking for her oxy to be refilled.      Please advise    Routing to Dr. Briana Pérez

## 2021-03-07 ENCOUNTER — TELEPHONE (OUTPATIENT)
Dept: OBGYN CLINIC | Age: 36
End: 2021-03-07

## 2021-03-08 ENCOUNTER — TELEPHONE (OUTPATIENT)
Dept: OBGYN CLINIC | Age: 36
End: 2021-03-08

## 2021-03-08 ENCOUNTER — OFFICE VISIT (OUTPATIENT)
Dept: OBGYN CLINIC | Age: 36
End: 2021-03-08

## 2021-03-08 VITALS
WEIGHT: 141.8 LBS | BODY MASS INDEX: 22.21 KG/M2 | SYSTOLIC BLOOD PRESSURE: 116 MMHG | HEART RATE: 88 BPM | DIASTOLIC BLOOD PRESSURE: 64 MMHG | TEMPERATURE: 98.7 F

## 2021-03-08 DIAGNOSIS — N94.6 SEVERE DYSMENORRHEA: ICD-10-CM

## 2021-03-08 DIAGNOSIS — R10.2 CHRONIC PELVIC PAIN IN FEMALE: ICD-10-CM

## 2021-03-08 DIAGNOSIS — R10.2 PELVIC PAIN IN FEMALE: Primary | ICD-10-CM

## 2021-03-08 DIAGNOSIS — N92.0 MENORRHAGIA WITH REGULAR CYCLE: ICD-10-CM

## 2021-03-08 DIAGNOSIS — N20.0 RENAL LITHIASIS: ICD-10-CM

## 2021-03-08 DIAGNOSIS — D25.1 INTRAMURAL LEIOMYOMA OF UTERUS: ICD-10-CM

## 2021-03-08 DIAGNOSIS — G89.29 CHRONIC PELVIC PAIN IN FEMALE: ICD-10-CM

## 2021-03-08 PROCEDURE — PREOPEXAM PRE-OP EXAM: Performed by: OBSTETRICS & GYNECOLOGY

## 2021-03-08 RX ORDER — OXYCODONE HYDROCHLORIDE 5 MG/1
5 TABLET ORAL EVERY 6 HOURS PRN
Qty: 20 TABLET | Refills: 0 | Status: SHIPPED | OUTPATIENT
Start: 2021-03-08 | End: 2021-03-15

## 2021-03-08 ASSESSMENT — ENCOUNTER SYMPTOMS
VOMITING: 0
SHORTNESS OF BREATH: 0
BACK PAIN: 1
CONSTIPATION: 0
DIARRHEA: 0
ABDOMINAL PAIN: 1
NAUSEA: 0
ABDOMINAL DISTENTION: 0

## 2021-03-08 NOTE — LETTER
[facility]  [address]  [address]  Phone: [phone]  Fax: [phone]      March 8, 2021     Patient: Blanca Hebert   YOB: 1985   Date of Visit: 3/8/2021       To Whom it May Concern:    Blanca Hebert was seen in my clinic on 3/8/2021. She is not able to return to work until she is fully recovered from her upcoming surgery. We are currently looking at May the 3rd, of 2021. If you have any further questions or concerns, please do not hesitate to call my office.       Sincerely,         Salome Matthews, DO

## 2021-03-08 NOTE — PROGRESS NOTES
Preoperative Screening for Elective Surgery/Invasive Procedures While COVID-19 present in the community     Have you had any of the following symptoms? NONE  o Fever, chills  o Cough  o Shortness of breath  o Muscle aches/pain  o Diarrhea  o Abdominal pain, nausea, vomiting  o Loss or decrease in taste and / or smell   Risk of Exposure  o Have you recently been hospitalized for COVID-19 or flu-like illness, if so when? NO  o Recently diagnosed with COVID-19, if so when? NO  o Recently tested for COVID-19, if so when? NO  o Have you been in close contact with a person or family member who currently has or recently had [address]? If yes, when and in what context? NO  o Do you live with anybody who in the last 14 days has had fever, chills, shortness of breath, muscle aches, flu-like illness? NO  o Do you have any close contacts or family members who are currently in the hospital for COVID-19 or flu-like illness? If yes, assess recent close contact with this person. NO    Indicate if the patient has a positive screen by answering yes to one or more of the above questions. Patients who test positive or screen positive prior to surgery or on the day of surgery should be evaluated in conjunction with the surgeon/proceduralist/anesthesiologist to determine the urgency of the procedure.

## 2021-03-09 NOTE — PROGRESS NOTES
Subjective:      Patient ID: Delroy Salas is a 28 y.o. female. HPI  29 y/o  female presents for preoperative visit to sign consents and discuss surgery. Patient is scheduled for Davinci robotic total laparoscopic hysterectomy, bilateral salpingectomy secondary to menorrhagia and severe dysmenorrhea. Menses occur every month x 1 week, heavy, changes super plus tampon every hour, 6-8 tampons and pads every day, +dysmenorrhea. Has had chronic menstrual issues. Symptoms have continued to worsen over time. Patient is 3 years S/P uncomplicated Vaginal Delivery. The pregnancy was complicated by gestational diabetes A2, previous pregnancies with gestational DM, gestational HTN, fetal demise at 21 weeks and uterine fibroid. Sexually active, no problems. Does not desire future childbearing. Medical history positive for recent issues with renal lithiasis. Review of Systems   Constitutional: Positive for activity change. Negative for appetite change, chills, fatigue, fever and unexpected weight change. Respiratory: Negative for shortness of breath. Cardiovascular: Negative for chest pain. Gastrointestinal: Positive for abdominal pain. Negative for abdominal distention, constipation, diarrhea, nausea and vomiting. Endocrine: Negative for cold intolerance and heat intolerance. Genitourinary: Positive for flank pain, menstrual problem and pelvic pain. Negative for difficulty urinating, dysuria, frequency, genital sores, hematuria, vaginal bleeding, vaginal discharge and vaginal pain. Musculoskeletal: Positive for back pain. Skin: Negative for rash. Neurological: Negative for headaches. Hematological: Does not bruise/bleed easily. Objective:   Physical Exam  Vitals signs and nursing note reviewed. Constitutional:       General: She is not in acute distress. Appearance: She is well-developed. She is not diaphoretic.    Pulmonary:      Effort: Pulmonary effort is normal. Skin:     General: Skin is warm and dry. Neurological:      Mental Status: She is alert and oriented to person, place, and time. Psychiatric:         Behavior: Behavior normal.         Thought Content: Thought content normal.         Judgment: Judgment normal.       EXAMINATION:   CT OF THE ABDOMEN AND PELVIS WITHOUT CONTRAST 2/4/2021 5:55 pm       TECHNIQUE:   CT of the abdomen and pelvis was performed without the administration of   intravenous contrast. Multiplanar reformatted images are provided for review. Dose modulation, iterative reconstruction, and/or weight based adjustment of   the mA/kV was utilized to reduce the radiation dose to as low as reasonably   achievable.       COMPARISON:   None       HISTORY:   ORDERING SYSTEM PROVIDED HISTORY: right flank pain r/o stone   TECHNOLOGIST PROVIDED HISTORY:   Reason for exam:->right flank pain r/o stone   Additional Contrast?->None   Decision Support Exception->Emergency Medical Condition (MA)   Is the patient pregnant?->No   Reason for Exam: Right sided flank pain; r/o kidney stone. pt went to urgent   care first, they tested her urine and did find a little bit of blood. Acuity: Acute   Type of Exam: Initial   Relevant Medical/Surgical History: hx of kidney stones       FINDINGS:   Lower Chest:   The lung bases are clear       Organs: The liver and spleen are unremarkable.  The gallbladder, pancreas,   and bile ducts are normal.  The adrenals are normal.  The kidneys are normal   size with small stones along the upper pole on the left measuring up to 4 mm   and a 3 mm stone along the lower pole on the right with no hydronephrosis or   renal masses.  The ureters are normal caliber.       GI/Bowel:  There is moderate feces scattered in the colon and rectum.  There   are some small air-fluid levels throughout the bowel with small air-fluid   levels throughout which is predominant involving the small bowel.  There is   no wall thickening and the mesentery is unremarkable.       Pelvis:   The uterus is grossly normal size with a 3 cm partially calcified   exophytic nodule along the fundus posteriorly.  There is no adnexa mass or   free fluid.  The bladder is unremarkable.       Peritoneum/Retroperitoneum:   No retroperitoneal mass or adenopathy is seen. The aorta is grossly unremarkable.       Bones/Soft Tissues: The bones are intact.           Impression   Small air-fluid levels throughout the bowel which is mildly dilated and   predominantly involving the small bowel.  This could be due to   gastroenteritis or possible developing early ileus.  Recommend clinical   follow-up       Small renal stones bilaterally with no hydronephrosis or urinary obstruction       Mild constipation.       3 cm partially calcified exophytic fibroid along the uterus posteriorly with   no adnexal mass or free fluid         PELVIC ULTRASOUND without DOPPLER INTERROGATION    NON OB       DATE: 10/5/2020       PHYSICIAN: GENIA Saini        SONOGRAPHER: Dorota Lanier RDMS       INDICATION: menorrhagia       TYPE OF SCAN: vaginal       FINDINGS:     There is a moderate amount of pelvic free fluid with several septations noted. The cervix is normal and not enlarged. Nabothian cyst/s is not noted within the uterine cervix. The uterus measures 7.69 cm x 5.52 cm x 4.87 cm.     The uterus is retroverted. The endometrium measures 8.23 mm. The myometrium is heterogeneous in appearance. A single fundal fibroid is noted measuring 2.00 x 2.47 x 3.32cm. The right ovary is present and normal.  The right ovary measures 3.38 cm x 3.35 cm x 2.20 cm.     Ovary findings: No masses seen. The right adnexa is normal.   The left ovary is present and normal.  The left ovary measures 3.06 cm x 2.41 cm x 2.11 cm.     Ovary findings: No masses seen. The left adnexa is normal.       IMPRESSION: Fibroid uterus. Pelvic free fluid with septations noted.  Normal right ovary. Normal left ovary.    Normal blood flow seen to right and left ovary. Imaging is limited secondary to bowel gas. The patient is well aware of the limitations of ultrasound in the detection of anomalies of the abdomen and pelvis. Assessment:       Diagnosis Orders   1. Pelvic pain in female     2. Renal lithiasis     3. Intramural leiomyoma of uterus     4. Severe dysmenorrhea     5. Menorrhagia with regular cycle     6. Chronic pelvic pain in female             Plan:      Options for treatment reviewed--risks and benefits discussed. Written consent obtained for upcoming surgery. Bowel prep options reviewed. Rx refill oxycodone provided--no further pain medication until postop  Follow up for surgery--3/18/2021.            Tyrone Matthews,

## 2021-03-09 NOTE — TELEPHONE ENCOUNTER
HPI: 43-year-old male who comes to clinic complaining of nasal congestion that has been going on for a week and yesterday he started noticing loss of taste.  He denies any fevers, chills, shortness of lightheadedness, dizziness, nausea, vomiting, diarrhea or any other associated symptoms.  He has been doing well otherwise.  He denies any known exposure to COVID-19.    Patient was seen and examined wearing full PPE      Review of Systems   Constitutional: Negative for chills and fever.   HENT: Positive for congestion. Negative for sore throat.    Eyes: Negative for blurred vision.   Respiratory: Negative for cough and shortness of breath.    Cardiovascular: Negative for chest pain and palpitations.   Gastrointestinal: Negative for abdominal pain, diarrhea, nausea and vomiting.   Musculoskeletal: Negative for myalgias.   Skin: Negative for rash.   Neurological: Negative for dizziness and headaches.      10 point of systems otherwise negative.           PAST MEDICAL HX:  None    Family history was reviewed and was noncontributory to the present illness.    Social History     Tobacco Use   • Smoking status: Never Smoker   • Smokeless tobacco: Never Used   Substance Use Topics   • Alcohol use: Not on file   • Drug use: Not on file        Vitals:    12/09/20 1604   BP: (!) 138/95   Pulse: 97   Resp: 15   Temp: 98.4 °F (36.9 °C)        Physical Exam   Constitutional: He is well-developed, well-nourished, and in no distress.   HENT:   Head: Normocephalic and atraumatic.   Right Ear: External ear normal.   Left Ear: External ear normal.   Mouth/Throat: Oropharynx is clear and moist.   Eyes: Conjunctivae are normal.   Neck: Normal range of motion.   Cardiovascular: Normal rate, regular rhythm and normal heart sounds.   Pulmonary/Chest: Effort normal and breath sounds normal. No respiratory distress.   Abdominal: He exhibits no distension.   Musculoskeletal: Normal range of motion.   Neurological: He is alert. Gait normal.  I spoke to patient and let her know that I will update her FMLA form & change the letter.   Skin: No rash noted.   Psychiatric: Mood and affect normal.          No results found for this visit on 12/09/20.        MDM:Due to patient's symptoms, patient was tested for COVID-19.  Patient otherwise appears in no distress.  Patient does not appear septic or toxic.  They were advised to self quarantine until their symptoms resolve and there results come back.  They were advised to follow-up with their PCP in 3 to 5 days for recheck or go to the ED if symptoms worsen.  Patient verbalized understanding.      ED Diagnosis        Final diagnosis    Suspected COVID-19 virus infection     Associated Orders          2019 NOVEL CORONAVIRUS (SARS-COV-2)            New Prescriptions    No medications on file         Disposition: Home

## 2021-03-15 ENCOUNTER — OFFICE VISIT (OUTPATIENT)
Dept: PRIMARY CARE CLINIC | Age: 36
End: 2021-03-15
Payer: COMMERCIAL

## 2021-03-15 DIAGNOSIS — Z01.818 PREOP TESTING: Primary | ICD-10-CM

## 2021-03-15 PROCEDURE — 99211 OFF/OP EST MAY X REQ PHY/QHP: CPT | Performed by: NURSE PRACTITIONER

## 2021-03-15 NOTE — PATIENT INSTRUCTIONS

## 2021-03-15 NOTE — PROGRESS NOTES
[facility] received a viral test for COVID-19. They were educated on isolation and quarantine as appropriate. For any symptoms, they were directed to seek care from their PCP, given contact information to establish with a doctor, directed to an urgent care or the emergency room.

## 2021-03-16 LAB — SARS-COV-2: NOT DETECTED

## 2021-03-17 ENCOUNTER — ANESTHESIA EVENT (OUTPATIENT)
Dept: OPERATING ROOM | Age: 36
End: 2021-03-17
Payer: COMMERCIAL

## 2021-03-17 NOTE — ANESTHESIA PRE PROCEDURE
Department of Anesthesiology  Preprocedure Note       Name:  Evaristo Richardson   Age:  28 y.o.  :  1985                                          MRN:  [id number]         Date:  3/18/2021      Surgeon: Mak Ribera DO    Procedure:  DAVINCI ROBOTIC TOTAL LAPAROSCOPIC HYSTERECTOMY, BILATERAL SALPINGECTOMY, POSSIBLE BILATERAL OOPHORECTOMY     HPI:  27 y/o  female with menorrhagia and severe dysmenorrhea. Menses occur every month x 1 week, heavy, changes super plus tampon every hour, 6-8 tampons and pads every day, +dysmenorrhea. Has had chronic menstrual issues. Symptoms have continued to worsen over time. The pregnancy was complicated by gestational diabetes A2, previous pregnancies with gestational DM, gestational HTN, fetal demise at 21 weeks and uterine fibroid. Medical history positive for recent issues with renal lithiasis. [id number] Sinus tachycardia;  Otherwise normal; No previous ECGs available    Medications prior to admission:    DULoxetine (CYMBALTA) 30 MG extended release capsule Take 30 mg by mouth nightly    atenolol (TENORMIN) 25 MG tablet Take 50 mg by mouth nightly      Allergies:  No Known Allergies    Problem List:     Menorrhagia with regular cycle    Uterine fibroid    History of pregnancy induced hypertension    History of gestational diabetes    Septal defect, heart     (normal spontaneous vaginal delivery)    Chronic pelvic pain in female    Renal lithiasis    Severe dysmenorrhea     Past Medical History:     Diabetes mellitus ([address] 2017    Elevated blood pressure complicating pregnancy, antepartum 3/6/2014    MAXWELL (generalized anxiety disorder)     Hypertension     Proteinuria in pregnancy, antepartum 3/23/2014    Septal defect     spontaneous closure     Past Surgical History:     CYST REMOVAL Left     eye    TONSILLECTOMY AND ADENOIDECTOMY      WISDOM TOOTH EXTRACTION       Social History:    Tobacco Use    Smoking status: Never Smoker    Smokeless tobacco: Never Used   Substance Use Topics    Alcohol use: Not Currently     Vital Signs (Current):    BP: 137/94 Pulse: 94   Resp: 16 SpO2: 97   Temp: 98.9 °F (37.2 °C)   Height: 5' 7\" (1.702 m)  (03/18/21) Weight: 142 lb 3 oz (64.5 kg)  (03/18/21)   BMI: 22.3           BP Readings from Last 3 Encounters:   03/08/21 116/64   02/18/21 (!) 165/100   02/04/21 (!) 151/99     NPO Status: >8 hrs                        BMI:   Wt Readings from Last 3 Encounters:   03/08/21 141 lb 12.8 oz (64.3 kg)   02/18/21 140 lb (63.5 kg)   02/04/21 140 lb (63.5 kg)     Body mass index is 22.71 kg/m². CBC:    WBC 15.3 02/04/2021    HGB 13.9 02/04/2021    HCT 42.2 02/04/2021     02/04/2021     CMP:     02/04/2021    K 3.7 02/04/2021    CL 98 02/04/2021    CO2 27 02/04/2021    BUN 16 02/04/2021    CREATININE 0.7 02/04/2021    GFRAA >60 02/04/2021    GLUCOSE 98 02/04/2021    PROT 8.2 02/04/2021    CALCIUM 9.5 02/04/2021    BILITOT 0.3 02/04/2021    ALKPHOS 42 02/04/2021    AST 15 02/04/2021    ALT 6 53/46/4916     HCG (If Applicable): negative    COVID-19 Screening (If Applicable):     COVID19 Not Detected 03/15/2021     Anesthesia Evaluation  Patient summary reviewed and Nursing notes reviewed no history of anesthetic complications:   Airway: Mallampati: II  TM distance: >3 FB   Neck ROM: full  Mouth opening: > = 3 FB Dental:          Pulmonary: breath sounds clear to auscultation      (-) COPD, asthma, recent URI, sleep apnea, wheezes and not a current smoker                           Cardiovascular:  Exercise tolerance: good (>4 METS),   (+) hypertension:,     (-) past MI,  angina,  BARRAZA and murmur      Rhythm: regular  Rate: normal                    Neuro/Psych:   (+) psychiatric history:depression/anxiety    (-) seizures, TIA and CVA           GI/Hepatic/Renal:   (+) renal disease: kidney stones,      (-) GERD, hepatitis and liver disease       Endo/Other:        (-) diabetes mellitus, hypothyroidism, arthritis               Abdominal:           Vascular:     - DVT and PE. Anesthesia Plan      general     ASA 2       Induction: intravenous. MIPS: Prophylactic antiemetics administered. Anesthetic plan and risks discussed with patient. Plan discussed with CRNA.             Elsi Adams MD

## 2021-03-18 ENCOUNTER — ANESTHESIA (OUTPATIENT)
Dept: OPERATING ROOM | Age: 36
End: 2021-03-18
Payer: COMMERCIAL

## 2021-03-18 ENCOUNTER — HOSPITAL ENCOUNTER (OUTPATIENT)
Age: 36
Discharge: HOME | End: 2021-03-19
Attending: OBSTETRICS & GYNECOLOGY | Admitting: OBSTETRICS & GYNECOLOGY
Payer: COMMERCIAL

## 2021-03-18 VITALS
DIASTOLIC BLOOD PRESSURE: 81 MMHG | OXYGEN SATURATION: 100 % | SYSTOLIC BLOOD PRESSURE: 112 MMHG | RESPIRATION RATE: 25 BRPM

## 2021-03-18 DIAGNOSIS — D25.9 UTERINE LEIOMYOMA, UNSPECIFIED LOCATION: ICD-10-CM

## 2021-03-18 DIAGNOSIS — N94.6 DYSMENORRHEA: ICD-10-CM

## 2021-03-18 DIAGNOSIS — N92.0 MENORRHAGIA WITH REGULAR CYCLE: ICD-10-CM

## 2021-03-18 DIAGNOSIS — Z98.890 S/P ROBOT-ASSISTED SURGICAL PROCEDURE: Primary | ICD-10-CM

## 2021-03-18 LAB
ABO/RH: NORMAL
ANION GAP SERPL CALCULATED.3IONS-SCNC: 11 MMOL/L (ref 3–16)
ANTIBODY SCREEN: NORMAL
BUN BLDV-MCNC: 14 MG/DL (ref 7–20)
CALCIUM SERPL-MCNC: 9.9 MG/DL (ref 8.3–10.6)
CHLORIDE BLD-SCNC: 105 MMOL/L (ref 99–110)
CO2: 25 MMOL/L (ref 21–32)
CREAT SERPL-MCNC: 0.6 MG/DL (ref 0.6–1.1)
GFR AFRICAN AMERICAN: >60
GFR NON-AFRICAN AMERICAN: >60
GLUCOSE BLD-MCNC: 105 MG/DL (ref 70–99)
HCT VFR BLD CALC: 39.3 % (ref 36–48)
HEMOGLOBIN: 13 G/DL (ref 12–16)
MCH RBC QN AUTO: 29.7 PG (ref 26–34)
MCHC RBC AUTO-ENTMCNC: 33.1 G/DL (ref 31–36)
MCV RBC AUTO: 89.6 FL (ref 80–100)
PDW BLD-RTO: 13.1 % (ref 12.4–15.4)
PLATELET # BLD: 350 K/UL (ref 135–450)
PMV BLD AUTO: 7.5 FL (ref 5–10.5)
POTASSIUM REFLEX MAGNESIUM: 4.2 MMOL/L (ref 3.5–5.1)
PREGNANCY, URINE: NEGATIVE
RBC # BLD: 4.38 M/UL (ref 4–5.2)
SODIUM BLD-SCNC: 141 MMOL/L (ref 136–145)
WBC # BLD: 7.8 K/UL (ref 4–11)

## 2021-03-18 PROCEDURE — S2900 ROBOTIC SURGICAL SYSTEM: HCPCS | Performed by: OBSTETRICS & GYNECOLOGY

## 2021-03-18 PROCEDURE — 86850 RBC ANTIBODY SCREEN: CPT

## 2021-03-18 PROCEDURE — 88307 TISSUE EXAM BY PATHOLOGIST: CPT

## 2021-03-18 PROCEDURE — 2580000003 HC RX 258: Performed by: NURSE ANESTHETIST, CERTIFIED REGISTERED

## 2021-03-18 PROCEDURE — 6370000000 HC RX 637 (ALT 250 FOR IP): Performed by: ANESTHESIOLOGY

## 2021-03-18 PROCEDURE — 6370000000 HC RX 637 (ALT 250 FOR IP): Performed by: OBSTETRICS & GYNECOLOGY

## 2021-03-18 PROCEDURE — 86900 BLOOD TYPING SEROLOGIC ABO: CPT

## 2021-03-18 PROCEDURE — 6360000002 HC RX W HCPCS: Performed by: NURSE ANESTHETIST, CERTIFIED REGISTERED

## 2021-03-18 PROCEDURE — 7100000001 HC PACU RECOVERY - ADDTL 15 MIN: Performed by: OBSTETRICS & GYNECOLOGY

## 2021-03-18 PROCEDURE — 3600000009 HC SURGERY ROBOT BASE: Performed by: OBSTETRICS & GYNECOLOGY

## 2021-03-18 PROCEDURE — 85027 COMPLETE CBC AUTOMATED: CPT

## 2021-03-18 PROCEDURE — 3700000000 HC ANESTHESIA ATTENDED CARE: Performed by: OBSTETRICS & GYNECOLOGY

## 2021-03-18 PROCEDURE — 6360000002 HC RX W HCPCS: Performed by: ANESTHESIOLOGY

## 2021-03-18 PROCEDURE — 94761 N-INVAS EAR/PLS OXIMETRY MLT: CPT

## 2021-03-18 PROCEDURE — 2580000003 HC RX 258: Performed by: OBSTETRICS & GYNECOLOGY

## 2021-03-18 PROCEDURE — 6360000002 HC RX W HCPCS: Performed by: OBSTETRICS & GYNECOLOGY

## 2021-03-18 PROCEDURE — 80048 BASIC METABOLIC PNL TOTAL CA: CPT

## 2021-03-18 PROCEDURE — 2500000003 HC RX 250 WO HCPCS: Performed by: NURSE ANESTHETIST, CERTIFIED REGISTERED

## 2021-03-18 PROCEDURE — 2700000000 HC OXYGEN THERAPY PER DAY

## 2021-03-18 PROCEDURE — 3700000001 HC ADD 15 MINUTES (ANESTHESIA): Performed by: OBSTETRICS & GYNECOLOGY

## 2021-03-18 PROCEDURE — 7100000000 HC PACU RECOVERY - FIRST 15 MIN: Performed by: OBSTETRICS & GYNECOLOGY

## 2021-03-18 PROCEDURE — 84703 CHORIONIC GONADOTROPIN ASSAY: CPT

## 2021-03-18 PROCEDURE — 3600000019 HC SURGERY ROBOT ADDTL 15MIN: Performed by: OBSTETRICS & GYNECOLOGY

## 2021-03-18 PROCEDURE — 2709999900 HC NON-CHARGEABLE SUPPLY: Performed by: OBSTETRICS & GYNECOLOGY

## 2021-03-18 PROCEDURE — 86901 BLOOD TYPING SEROLOGIC RH(D): CPT

## 2021-03-18 PROCEDURE — 58571 TLH W/T/O 250 G OR LESS: CPT | Performed by: OBSTETRICS & GYNECOLOGY

## 2021-03-18 RX ORDER — MEPERIDINE HYDROCHLORIDE 50 MG/ML
12.5 INJECTION INTRAMUSCULAR; INTRAVENOUS; SUBCUTANEOUS EVERY 5 MIN PRN
Status: DISCONTINUED | OUTPATIENT
Start: 2021-03-18 | End: 2021-03-18

## 2021-03-18 RX ORDER — OXYCODONE HYDROCHLORIDE AND ACETAMINOPHEN 5; 325 MG/1; MG/1
2 TABLET ORAL EVERY 6 HOURS PRN
Status: DISCONTINUED | OUTPATIENT
Start: 2021-03-18 | End: 2021-03-19 | Stop reason: HOSPADM

## 2021-03-18 RX ORDER — PROMETHAZINE HYDROCHLORIDE 25 MG/1
12.5 TABLET ORAL EVERY 6 HOURS PRN
Status: DISCONTINUED | OUTPATIENT
Start: 2021-03-18 | End: 2021-03-19 | Stop reason: HOSPADM

## 2021-03-18 RX ORDER — SODIUM CHLORIDE, SODIUM LACTATE, POTASSIUM CHLORIDE, CALCIUM CHLORIDE 600; 310; 30; 20 MG/100ML; MG/100ML; MG/100ML; MG/100ML
INJECTION, SOLUTION INTRAVENOUS CONTINUOUS PRN
Status: DISCONTINUED | OUTPATIENT
Start: 2021-03-18 | End: 2021-03-18 | Stop reason: SDUPTHER

## 2021-03-18 RX ORDER — OXYCODONE HYDROCHLORIDE AND ACETAMINOPHEN 5; 325 MG/1; MG/1
1 TABLET ORAL PRN
Status: COMPLETED | OUTPATIENT
Start: 2021-03-18 | End: 2021-03-18

## 2021-03-18 RX ORDER — ROCURONIUM BROMIDE 10 MG/ML
INJECTION, SOLUTION INTRAVENOUS PRN
Status: DISCONTINUED | OUTPATIENT
Start: 2021-03-18 | End: 2021-03-18 | Stop reason: SDUPTHER

## 2021-03-18 RX ORDER — ONDANSETRON 2 MG/ML
4 INJECTION INTRAMUSCULAR; INTRAVENOUS
Status: DISCONTINUED | OUTPATIENT
Start: 2021-03-18 | End: 2021-03-18

## 2021-03-18 RX ORDER — LIDOCAINE HYDROCHLORIDE 20 MG/ML
INJECTION, SOLUTION EPIDURAL; INFILTRATION; INTRACAUDAL; PERINEURAL PRN
Status: DISCONTINUED | OUTPATIENT
Start: 2021-03-18 | End: 2021-03-18 | Stop reason: SDUPTHER

## 2021-03-18 RX ORDER — ONDANSETRON 2 MG/ML
INJECTION INTRAMUSCULAR; INTRAVENOUS PRN
Status: DISCONTINUED | OUTPATIENT
Start: 2021-03-18 | End: 2021-03-18 | Stop reason: SDUPTHER

## 2021-03-18 RX ORDER — FENTANYL CITRATE 50 UG/ML
25 INJECTION, SOLUTION INTRAMUSCULAR; INTRAVENOUS EVERY 5 MIN PRN
Status: DISCONTINUED | OUTPATIENT
Start: 2021-03-18 | End: 2021-03-18

## 2021-03-18 RX ORDER — KETOROLAC TROMETHAMINE 30 MG/ML
INJECTION, SOLUTION INTRAMUSCULAR; INTRAVENOUS PRN
Status: DISCONTINUED | OUTPATIENT
Start: 2021-03-18 | End: 2021-03-18 | Stop reason: SDUPTHER

## 2021-03-18 RX ORDER — PROMETHAZINE HYDROCHLORIDE 25 MG/ML
6.25 INJECTION, SOLUTION INTRAMUSCULAR; INTRAVENOUS
Status: COMPLETED | OUTPATIENT
Start: 2021-03-18 | End: 2021-03-18

## 2021-03-18 RX ORDER — MAGNESIUM SULFATE HEPTAHYDRATE 500 MG/ML
INJECTION, SOLUTION INTRAMUSCULAR; INTRAVENOUS PRN
Status: DISCONTINUED | OUTPATIENT
Start: 2021-03-18 | End: 2021-03-18 | Stop reason: SDUPTHER

## 2021-03-18 RX ORDER — MIDAZOLAM HYDROCHLORIDE 1 MG/ML
2 INJECTION INTRAMUSCULAR; INTRAVENOUS
Status: COMPLETED | OUTPATIENT
Start: 2021-03-18 | End: 2021-03-18

## 2021-03-18 RX ORDER — SODIUM CHLORIDE, SODIUM LACTATE, POTASSIUM CHLORIDE, AND CALCIUM CHLORIDE .6; .31; .03; .02 G/100ML; G/100ML; G/100ML; G/100ML
IRRIGANT IRRIGATION PRN
Status: DISCONTINUED | OUTPATIENT
Start: 2021-03-18 | End: 2021-03-18 | Stop reason: ALTCHOICE

## 2021-03-18 RX ORDER — HYDROMORPHONE HCL 110MG/55ML
0.5 PATIENT CONTROLLED ANALGESIA SYRINGE INTRAVENOUS
Status: DISCONTINUED | OUTPATIENT
Start: 2021-03-18 | End: 2021-03-19 | Stop reason: HOSPADM

## 2021-03-18 RX ORDER — SODIUM CHLORIDE 0.9 % (FLUSH) 0.9 %
10 SYRINGE (ML) INJECTION EVERY 12 HOURS SCHEDULED
Status: DISCONTINUED | OUTPATIENT
Start: 2021-03-18 | End: 2021-03-19 | Stop reason: HOSPADM

## 2021-03-18 RX ORDER — ACETAMINOPHEN 325 MG/1
650 TABLET ORAL EVERY 4 HOURS PRN
Status: DISCONTINUED | OUTPATIENT
Start: 2021-03-18 | End: 2021-03-19 | Stop reason: HOSPADM

## 2021-03-18 RX ORDER — PROPOFOL 10 MG/ML
INJECTION, EMULSION INTRAVENOUS PRN
Status: DISCONTINUED | OUTPATIENT
Start: 2021-03-18 | End: 2021-03-18 | Stop reason: SDUPTHER

## 2021-03-18 RX ORDER — SODIUM CHLORIDE 0.9 % (FLUSH) 0.9 %
10 SYRINGE (ML) INJECTION PRN
Status: DISCONTINUED | OUTPATIENT
Start: 2021-03-18 | End: 2021-03-19 | Stop reason: HOSPADM

## 2021-03-18 RX ORDER — PROMETHAZINE HYDROCHLORIDE 25 MG/ML
INJECTION, SOLUTION INTRAMUSCULAR; INTRAVENOUS PRN
Status: DISCONTINUED | OUTPATIENT
Start: 2021-03-18 | End: 2021-03-18 | Stop reason: SDUPTHER

## 2021-03-18 RX ORDER — SODIUM CHLORIDE, SODIUM LACTATE, POTASSIUM CHLORIDE, CALCIUM CHLORIDE 600; 310; 30; 20 MG/100ML; MG/100ML; MG/100ML; MG/100ML
INJECTION, SOLUTION INTRAVENOUS CONTINUOUS
Status: DISCONTINUED | OUTPATIENT
Start: 2021-03-18 | End: 2021-03-19 | Stop reason: HOSPADM

## 2021-03-18 RX ORDER — OXYCODONE HYDROCHLORIDE AND ACETAMINOPHEN 5; 325 MG/1; MG/1
1 TABLET ORAL EVERY 6 HOURS PRN
Status: DISCONTINUED | OUTPATIENT
Start: 2021-03-18 | End: 2021-03-19 | Stop reason: HOSPADM

## 2021-03-18 RX ORDER — DULOXETIN HYDROCHLORIDE 30 MG/1
30 CAPSULE, DELAYED RELEASE ORAL NIGHTLY
Status: DISCONTINUED | OUTPATIENT
Start: 2021-03-18 | End: 2021-03-19 | Stop reason: HOSPADM

## 2021-03-18 RX ORDER — ONDANSETRON 2 MG/ML
4 INJECTION INTRAMUSCULAR; INTRAVENOUS EVERY 6 HOURS PRN
Status: DISCONTINUED | OUTPATIENT
Start: 2021-03-18 | End: 2021-03-19 | Stop reason: HOSPADM

## 2021-03-18 RX ORDER — KETOROLAC TROMETHAMINE 30 MG/ML
30 INJECTION, SOLUTION INTRAMUSCULAR; INTRAVENOUS EVERY 6 HOURS
Status: DISCONTINUED | OUTPATIENT
Start: 2021-03-18 | End: 2021-03-19 | Stop reason: HOSPADM

## 2021-03-18 RX ORDER — OXYCODONE HYDROCHLORIDE AND ACETAMINOPHEN 5; 325 MG/1; MG/1
2 TABLET ORAL PRN
Status: COMPLETED | OUTPATIENT
Start: 2021-03-18 | End: 2021-03-18

## 2021-03-18 RX ORDER — HYDRALAZINE HYDROCHLORIDE 20 MG/ML
5 INJECTION INTRAMUSCULAR; INTRAVENOUS EVERY 10 MIN PRN
Status: DISCONTINUED | OUTPATIENT
Start: 2021-03-18 | End: 2021-03-18

## 2021-03-18 RX ORDER — DEXAMETHASONE SODIUM PHOSPHATE 4 MG/ML
INJECTION, SOLUTION INTRA-ARTICULAR; INTRALESIONAL; INTRAMUSCULAR; INTRAVENOUS; SOFT TISSUE PRN
Status: DISCONTINUED | OUTPATIENT
Start: 2021-03-18 | End: 2021-03-18 | Stop reason: SDUPTHER

## 2021-03-18 RX ORDER — FENTANYL CITRATE 50 UG/ML
INJECTION, SOLUTION INTRAMUSCULAR; INTRAVENOUS PRN
Status: DISCONTINUED | OUTPATIENT
Start: 2021-03-18 | End: 2021-03-18 | Stop reason: SDUPTHER

## 2021-03-18 RX ORDER — HYDROMORPHONE HCL 110MG/55ML
PATIENT CONTROLLED ANALGESIA SYRINGE INTRAVENOUS PRN
Status: DISCONTINUED | OUTPATIENT
Start: 2021-03-18 | End: 2021-03-18 | Stop reason: SDUPTHER

## 2021-03-18 RX ADMIN — ROCURONIUM BROMIDE 25 MG: 10 SOLUTION INTRAVENOUS at 08:49

## 2021-03-18 RX ADMIN — PROPOFOL 200 MG: 10 INJECTION, EMULSION INTRAVENOUS at 07:31

## 2021-03-18 RX ADMIN — DEXMEDETOMIDINE HYDROCHLORIDE 4 MCG: 100 INJECTION, SOLUTION INTRAVENOUS at 09:10

## 2021-03-18 RX ADMIN — KETOROLAC TROMETHAMINE 30 MG: 30 INJECTION, SOLUTION INTRAMUSCULAR; INTRAVENOUS at 09:04

## 2021-03-18 RX ADMIN — MAGNESIUM SULFATE HEPTAHYDRATE 1 G: 500 INJECTION, SOLUTION INTRAMUSCULAR; INTRAVENOUS at 09:04

## 2021-03-18 RX ADMIN — DEXMEDETOMIDINE HYDROCHLORIDE 4 MCG: 100 INJECTION, SOLUTION INTRAVENOUS at 09:08

## 2021-03-18 RX ADMIN — HYDROMORPHONE HYDROCHLORIDE 0.5 MG: 2 INJECTION, SOLUTION INTRAMUSCULAR; INTRAVENOUS; SUBCUTANEOUS at 17:52

## 2021-03-18 RX ADMIN — DEXMEDETOMIDINE HYDROCHLORIDE 4 MCG: 100 INJECTION, SOLUTION INTRAVENOUS at 09:02

## 2021-03-18 RX ADMIN — HYDROMORPHONE HYDROCHLORIDE 0.5 MG: 1 INJECTION, SOLUTION INTRAMUSCULAR; INTRAVENOUS; SUBCUTANEOUS at 10:00

## 2021-03-18 RX ADMIN — KETOROLAC TROMETHAMINE 30 MG: 30 INJECTION, SOLUTION INTRAMUSCULAR at 21:29

## 2021-03-18 RX ADMIN — ROCURONIUM BROMIDE 25 MG: 10 SOLUTION INTRAVENOUS at 08:03

## 2021-03-18 RX ADMIN — HYDROMORPHONE HYDROCHLORIDE 0.4 MG: 2 INJECTION INTRAMUSCULAR; INTRAVENOUS; SUBCUTANEOUS at 09:00

## 2021-03-18 RX ADMIN — PROMETHAZINE HYDROCHLORIDE 5 MG: 25 INJECTION INTRAMUSCULAR; INTRAVENOUS at 08:10

## 2021-03-18 RX ADMIN — HYDROMORPHONE HYDROCHLORIDE 0.4 MG: 2 INJECTION INTRAMUSCULAR; INTRAVENOUS; SUBCUTANEOUS at 08:03

## 2021-03-18 RX ADMIN — MIDAZOLAM HYDROCHLORIDE 2 MG: 2 INJECTION, SOLUTION INTRAMUSCULAR; INTRAVENOUS at 07:15

## 2021-03-18 RX ADMIN — ONDANSETRON 4 MG: 2 INJECTION INTRAMUSCULAR; INTRAVENOUS at 07:15

## 2021-03-18 RX ADMIN — LIDOCAINE HYDROCHLORIDE 100 MG: 20 INJECTION, SOLUTION EPIDURAL; INFILTRATION; INTRACAUDAL; PERINEURAL at 07:31

## 2021-03-18 RX ADMIN — HYDROMORPHONE HYDROCHLORIDE 0.5 MG: 2 INJECTION, SOLUTION INTRAMUSCULAR; INTRAVENOUS; SUBCUTANEOUS at 21:29

## 2021-03-18 RX ADMIN — HYDROMORPHONE HYDROCHLORIDE 0.5 MG: 1 INJECTION, SOLUTION INTRAMUSCULAR; INTRAVENOUS; SUBCUTANEOUS at 10:09

## 2021-03-18 RX ADMIN — DULOXETINE HYDROCHLORIDE 30 MG: 30 CAPSULE, DELAYED RELEASE ORAL at 21:29

## 2021-03-18 RX ADMIN — KETOROLAC TROMETHAMINE 30 MG: 30 INJECTION, SOLUTION INTRAMUSCULAR at 15:20

## 2021-03-18 RX ADMIN — SODIUM CHLORIDE, SODIUM LACTATE, POTASSIUM CHLORIDE, AND CALCIUM CHLORIDE: .6; .31; .03; .02 INJECTION, SOLUTION INTRAVENOUS at 07:15

## 2021-03-18 RX ADMIN — DEXAMETHASONE SODIUM PHOSPHATE 10 MG: 4 INJECTION, SOLUTION INTRAMUSCULAR; INTRAVENOUS at 07:31

## 2021-03-18 RX ADMIN — PROMETHAZINE HYDROCHLORIDE 6.25 MG: 25 INJECTION INTRAMUSCULAR; INTRAVENOUS at 10:10

## 2021-03-18 RX ADMIN — OXYCODONE HYDROCHLORIDE AND ACETAMINOPHEN 2 TABLET: 5; 325 TABLET ORAL at 23:33

## 2021-03-18 RX ADMIN — DEXMEDETOMIDINE HYDROCHLORIDE 4 MCG: 100 INJECTION, SOLUTION INTRAVENOUS at 09:05

## 2021-03-18 RX ADMIN — HYDROMORPHONE HYDROCHLORIDE 0.5 MG: 1 INJECTION, SOLUTION INTRAMUSCULAR; INTRAVENOUS; SUBCUTANEOUS at 09:52

## 2021-03-18 RX ADMIN — DEXMEDETOMIDINE HYDROCHLORIDE 4 MCG: 100 INJECTION, SOLUTION INTRAVENOUS at 09:13

## 2021-03-18 RX ADMIN — SODIUM CHLORIDE, POTASSIUM CHLORIDE, SODIUM LACTATE AND CALCIUM CHLORIDE: 600; 310; 30; 20 INJECTION, SOLUTION INTRAVENOUS at 17:52

## 2021-03-18 RX ADMIN — CEFAZOLIN SODIUM 2 G: 10 INJECTION, POWDER, FOR SOLUTION INTRAVENOUS at 07:31

## 2021-03-18 RX ADMIN — HYDROMORPHONE HYDROCHLORIDE 0.5 MG: 1 INJECTION, SOLUTION INTRAMUSCULAR; INTRAVENOUS; SUBCUTANEOUS at 11:09

## 2021-03-18 RX ADMIN — MIDAZOLAM HYDROCHLORIDE 2 MG: 2 INJECTION, SOLUTION INTRAMUSCULAR; INTRAVENOUS at 07:08

## 2021-03-18 RX ADMIN — OXYCODONE HYDROCHLORIDE AND ACETAMINOPHEN 2 TABLET: 5; 325 TABLET ORAL at 12:45

## 2021-03-18 RX ADMIN — HYDROMORPHONE HYDROCHLORIDE 0.4 MG: 2 INJECTION INTRAMUSCULAR; INTRAVENOUS; SUBCUTANEOUS at 08:49

## 2021-03-18 RX ADMIN — SODIUM CHLORIDE, SODIUM LACTATE, POTASSIUM CHLORIDE, AND CALCIUM CHLORIDE: .6; .31; .03; .02 INJECTION, SOLUTION INTRAVENOUS at 09:05

## 2021-03-18 RX ADMIN — HYDROMORPHONE HYDROCHLORIDE 0.4 MG: 2 INJECTION INTRAMUSCULAR; INTRAVENOUS; SUBCUTANEOUS at 09:24

## 2021-03-18 RX ADMIN — FENTANYL CITRATE 100 MCG: 50 INJECTION INTRAMUSCULAR; INTRAVENOUS at 07:31

## 2021-03-18 RX ADMIN — OXYCODONE HYDROCHLORIDE AND ACETAMINOPHEN 2 TABLET: 5; 325 TABLET ORAL at 16:33

## 2021-03-18 RX ADMIN — SUGAMMADEX 129 MG: 100 INJECTION, SOLUTION INTRAVENOUS at 09:08

## 2021-03-18 RX ADMIN — ROCURONIUM BROMIDE 50 MG: 10 SOLUTION INTRAVENOUS at 07:31

## 2021-03-18 ASSESSMENT — PAIN DESCRIPTION - LOCATION: LOCATION: ABDOMEN

## 2021-03-18 ASSESSMENT — PULMONARY FUNCTION TESTS
PIF_VALUE: 25
PIF_VALUE: 24
PIF_VALUE: 16
PIF_VALUE: 18
PIF_VALUE: 14
PIF_VALUE: 22
PIF_VALUE: 25
PIF_VALUE: 3
PIF_VALUE: 25
PIF_VALUE: 2
PIF_VALUE: 25
PIF_VALUE: 24
PIF_VALUE: 25
PIF_VALUE: 12
PIF_VALUE: 24
PIF_VALUE: 25
PIF_VALUE: 25
PIF_VALUE: 24
PIF_VALUE: 12
PIF_VALUE: 25
PIF_VALUE: 12
PIF_VALUE: 25
PIF_VALUE: 16
PIF_VALUE: 16
PIF_VALUE: 25
PIF_VALUE: 24
PIF_VALUE: 0
PIF_VALUE: 1
PIF_VALUE: 23
PIF_VALUE: 25
PIF_VALUE: 16
PIF_VALUE: 24
PIF_VALUE: 21
PIF_VALUE: 25
PIF_VALUE: 1
PIF_VALUE: 24
PIF_VALUE: 15
PIF_VALUE: 24
PIF_VALUE: 25
PIF_VALUE: 15
PIF_VALUE: 15
PIF_VALUE: 25
PIF_VALUE: 16
PIF_VALUE: 12
PIF_VALUE: 23
PIF_VALUE: 13
PIF_VALUE: 25
PIF_VALUE: 3
PIF_VALUE: 24
PIF_VALUE: 12
PIF_VALUE: 12
PIF_VALUE: 15
PIF_VALUE: 24
PIF_VALUE: 26
PIF_VALUE: 12
PIF_VALUE: 15
PIF_VALUE: 13
PIF_VALUE: 16
PIF_VALUE: 24
PIF_VALUE: 23
PIF_VALUE: 15
PIF_VALUE: 12
PIF_VALUE: 12
PIF_VALUE: 0
PIF_VALUE: 25
PIF_VALUE: 24
PIF_VALUE: 25

## 2021-03-18 ASSESSMENT — PAIN SCALES - GENERAL
PAINLEVEL_OUTOF10: 8
PAINLEVEL_OUTOF10: 6
PAINLEVEL_OUTOF10: 6
PAINLEVEL_OUTOF10: 7
PAINLEVEL_OUTOF10: 8
PAINLEVEL_OUTOF10: 8
PAINLEVEL_OUTOF10: 9
PAINLEVEL_OUTOF10: 0
PAINLEVEL_OUTOF10: 7
PAINLEVEL_OUTOF10: 8

## 2021-03-18 ASSESSMENT — LIFESTYLE VARIABLES: SMOKING_STATUS: 0

## 2021-03-18 ASSESSMENT — PAIN DESCRIPTION - DESCRIPTORS
DESCRIPTORS: CRAMPING
DESCRIPTORS: CRAMPING

## 2021-03-18 ASSESSMENT — PAIN DESCRIPTION - ORIENTATION: ORIENTATION: RIGHT

## 2021-03-18 ASSESSMENT — PAIN DESCRIPTION - PAIN TYPE: TYPE: SURGICAL PAIN

## 2021-03-18 ASSESSMENT — PAIN DESCRIPTION - FREQUENCY: FREQUENCY: CONTINUOUS

## 2021-03-18 NOTE — PROGRESS NOTES
Patient admitted from OR to PACU. Bedside report received. Patient immediately hooked up to heart monitor. Vicente Llamas

## 2021-03-18 NOTE — PROGRESS NOTES
Patient transferred via bed in stable condition with all belongings to room 336.  at bedside. Monroe Perrin

## 2021-03-18 NOTE — PROGRESS NOTES
Arrived to [facility], consent signed in office and verified at this time. Patient tearful at this time emotional support given. NPO since 1900, belongings in locker 2.

## 2021-03-18 NOTE — OP NOTE
Operative Note      Patient: Artie Ellington  YOB: 1985  MRN: [id number]    Date of Procedure: 3/18/2021    Pre-Op Diagnosis: DYSMENORRHEA, MENORRHAGIA, UTERINE FIBROIDS    Post-Op Diagnosis: Same with endometriosis    Procedure:  Karint Prakash robotic total laparoscopic hysterectomy, bilateral salpingectomy       Surgeon(s):  Aleks Bradley DO    Assistant:   Surgical Assistant: Magui Rastafari  First Assistant: Mercy Marcial RN    Anesthesia: General ET    Estimated Blood Loss (mL): 50 cc  IVF: 1500 cc crystalloid  UO: 75 cc clear yellow urine    Complications: None    Specimens:   ID Type Source Tests Collected by Time Destination   A : CERVIX, UTERUS, BILATERAL FALLOPIAN TUBES AND FIBROID  Tissue Cervix SURGICAL PATHOLOGY [facility] Lian Matthews DO 3/18/2021 0846        Implants:  * No implants in log *      Drains:   Urethral Catheter Non-latex 16 fr (Active)       Findings: implants of endometriosis--cul de sac  Enlarged gallbladder, RUQ adhesions    Disposition: PACU  Condition: stable  Detailed Description of Procedure:   See dictation  [id number]    Electronically signed by Aleks Bradley DO on 3/18/2021 at 9:01 AM

## 2021-03-18 NOTE — ANESTHESIA POSTPROCEDURE EVALUATION
Department of Anesthesiology  Postprocedure Note    Patient: Adeel Masterson  MRN: [id number]  YOB: 1985  Date of evaluation: 3/18/2021    Procedure Summary     Date: 03/18/21 Room / Location: [address] [place] [address]    Anesthesia Start: 0643 Anesthesia Stop: 5922    Procedure: [address], BILATERAL SALPINGECTOMY,   CPT CODE - 88487 (N/A Perineum) Diagnosis:       Dysmenorrhea      Uterine leiomyoma, unspecified location      Menorrhagia with regular cycle      (DYSMENORRHEA, MENORRHAGIA, UTERINE FIBROIDS)    Surgeons: Doug Leahy DO Responsible Provider:     Anesthesia Type: general ASA Status: 2        Anesthesia Type: general    Leonardo Phase I: Leonardo Score: 8    Leonardo Phase II:      Last vitals: Reviewed and per EMR flowsheets.      Anesthesia Post Evaluation   Anesthetic Problems: no   Cardiovascular System Stable: yes  Respiratory Function: Airway Patent yes  ETT no  Ventilator no  Level of consciousness: awake, alert and oriented  Post-op pain: adequate analgesia  Hydration Adequate: yes  Nausea/Vomiting:no  Other Issues:     Sophia Reno MD

## 2021-03-18 NOTE — PROGRESS NOTES
Pt admitted to room 336 from PACU. Pt A/O x4. VSS. Pt very anxious and in a lot of pain, will administer PRN pain medication when approved by pharmacy. Collins catheter intact draining clear, yellow urine. x4 lap incisions all covered with gauze and tegaderm. Active bowel sounds, general diet ordered. Pt tolerating food so far. SCD's on. Bed locked and in lowest position. Call light and bedside table within reach. Will continue to monitor.

## 2021-03-19 VITALS
SYSTOLIC BLOOD PRESSURE: 124 MMHG | WEIGHT: 142.19 LBS | DIASTOLIC BLOOD PRESSURE: 76 MMHG | HEART RATE: 95 BPM | OXYGEN SATURATION: 96 % | BODY MASS INDEX: 22.32 KG/M2 | TEMPERATURE: 98.1 F | RESPIRATION RATE: 16 BRPM | HEIGHT: 67 IN

## 2021-03-19 LAB
BASOPHILS ABSOLUTE: 0 K/UL (ref 0–0.2)
BASOPHILS RELATIVE PERCENT: 0.1 %
EOSINOPHILS ABSOLUTE: 0 K/UL (ref 0–0.6)
EOSINOPHILS RELATIVE PERCENT: 0.2 %
HCT VFR BLD CALC: 35.3 % (ref 36–48)
HEMOGLOBIN: 11.6 G/DL (ref 12–16)
LYMPHOCYTES ABSOLUTE: 2.7 K/UL (ref 1–5.1)
LYMPHOCYTES RELATIVE PERCENT: 17.7 %
MCH RBC QN AUTO: 29.5 PG (ref 26–34)
MCHC RBC AUTO-ENTMCNC: 32.9 G/DL (ref 31–36)
MCV RBC AUTO: 89.8 FL (ref 80–100)
MONOCYTES ABSOLUTE: 1.5 K/UL (ref 0–1.3)
MONOCYTES RELATIVE PERCENT: 10.1 %
NEUTROPHILS ABSOLUTE: 11 K/UL (ref 1.7–7.7)
NEUTROPHILS RELATIVE PERCENT: 71.9 %
PDW BLD-RTO: 13.3 % (ref 12.4–15.4)
PLATELET # BLD: 293 K/UL (ref 135–450)
PMV BLD AUTO: 7.3 FL (ref 5–10.5)
RBC # BLD: 3.93 M/UL (ref 4–5.2)
WBC # BLD: 15.3 K/UL (ref 4–11)

## 2021-03-19 PROCEDURE — 6370000000 HC RX 637 (ALT 250 FOR IP): Performed by: OBSTETRICS & GYNECOLOGY

## 2021-03-19 PROCEDURE — 2580000003 HC RX 258: Performed by: OBSTETRICS & GYNECOLOGY

## 2021-03-19 PROCEDURE — 85025 COMPLETE CBC W/AUTO DIFF WBC: CPT

## 2021-03-19 PROCEDURE — 36415 COLL VENOUS BLD VENIPUNCTURE: CPT

## 2021-03-19 PROCEDURE — 6360000002 HC RX W HCPCS: Performed by: OBSTETRICS & GYNECOLOGY

## 2021-03-19 RX ORDER — OXYCODONE HYDROCHLORIDE AND ACETAMINOPHEN 5; 325 MG/1; MG/1
2 TABLET ORAL EVERY 6 HOURS PRN
Qty: 28 TABLET | Refills: 0 | Status: SHIPPED | OUTPATIENT
Start: 2021-03-19 | End: 2021-03-20

## 2021-03-19 RX ORDER — DOCUSATE SODIUM 100 MG/1
100 CAPSULE, LIQUID FILLED ORAL 2 TIMES DAILY PRN
Qty: 30 CAPSULE | Refills: 1 | Status: SHIPPED | OUTPATIENT
Start: 2021-03-19

## 2021-03-19 RX ORDER — HYDROMORPHONE HCL 110MG/55ML
0.5 PATIENT CONTROLLED ANALGESIA SYRINGE INTRAVENOUS ONCE
Status: COMPLETED | OUTPATIENT
Start: 2021-03-19 | End: 2021-03-19

## 2021-03-19 RX ADMIN — OXYCODONE HYDROCHLORIDE AND ACETAMINOPHEN 2 TABLET: 5; 325 TABLET ORAL at 12:22

## 2021-03-19 RX ADMIN — SODIUM CHLORIDE, POTASSIUM CHLORIDE, SODIUM LACTATE AND CALCIUM CHLORIDE: 600; 310; 30; 20 INJECTION, SOLUTION INTRAVENOUS at 03:48

## 2021-03-19 RX ADMIN — Medication 10 ML: at 08:39

## 2021-03-19 RX ADMIN — HYDROMORPHONE HYDROCHLORIDE 0.5 MG: 2 INJECTION, SOLUTION INTRAMUSCULAR; INTRAVENOUS; SUBCUTANEOUS at 03:48

## 2021-03-19 RX ADMIN — HYDROMORPHONE HYDROCHLORIDE 0.5 MG: 2 INJECTION, SOLUTION INTRAMUSCULAR; INTRAVENOUS; SUBCUTANEOUS at 11:28

## 2021-03-19 RX ADMIN — KETOROLAC TROMETHAMINE 30 MG: 30 INJECTION, SOLUTION INTRAMUSCULAR at 03:48

## 2021-03-19 RX ADMIN — HYDROMORPHONE HYDROCHLORIDE 0.5 MG: 2 INJECTION, SOLUTION INTRAMUSCULAR; INTRAVENOUS; SUBCUTANEOUS at 00:38

## 2021-03-19 RX ADMIN — HYDROMORPHONE HYDROCHLORIDE 0.5 MG: 2 INJECTION, SOLUTION INTRAMUSCULAR; INTRAVENOUS; SUBCUTANEOUS at 07:28

## 2021-03-19 RX ADMIN — HYDROMORPHONE HYDROCHLORIDE 0.5 MG: 2 INJECTION, SOLUTION INTRAMUSCULAR; INTRAVENOUS; SUBCUTANEOUS at 10:37

## 2021-03-19 RX ADMIN — OXYCODONE HYDROCHLORIDE AND ACETAMINOPHEN 2 TABLET: 5; 325 TABLET ORAL at 05:43

## 2021-03-19 RX ADMIN — KETOROLAC TROMETHAMINE 30 MG: 30 INJECTION, SOLUTION INTRAMUSCULAR at 08:38

## 2021-03-19 ASSESSMENT — PAIN SCALES - GENERAL
PAINLEVEL_OUTOF10: 5
PAINLEVEL_OUTOF10: 7
PAINLEVEL_OUTOF10: 6

## 2021-03-19 NOTE — PROGRESS NOTES
Shift assessment completed. Pt A&O, VSS. Pt continues to report pain, informed pt that we need to try and get away from IV pain medication to make sure pain is controlled before discharge. Pt still requiring IV dilaudid as well as oral pain medication at this time. Denies any needs at this time. Bed locked and in lowest position, side rails up 2/4. Call light within reach. Will continue to monitor.

## 2021-03-19 NOTE — PROGRESS NOTES
Pt D/C to home per order. PIV  Removed. D/C instructions reviewed. Verbalized understanding. Pt refused wheelchair. Walked off floor in stable condition with .

## 2021-03-19 NOTE — DISCHARGE SUMMARY
DISCHARGE SUMMARY      Primary Diagnosis: menorrhagia, severe dysmenorrhea, uterine fibroids  Secondary Diagnosis: mild anemia  Procedures: Davinci robotic total laparoscopic hysterectomy, bilateral salpingectomy  Referrals: none  Significant Findings: uterine fibroid, endometriosis  Reason for Hospitalization:  Hospital Course and Complications:        Discharge Instructions:    Diet: common adult  Activity: activity as tolerated, no heavy lifting or driving for 2 weeks, pelvic rest x 6 weeks  Medications: percocet, ibuprofen, colace, resume home medications  Disposition: Home  Condition on discharge: Stable  Follow up: in 2 week(s)

## 2021-03-20 ENCOUNTER — TELEPHONE (OUTPATIENT)
Dept: OBGYN | Age: 36
End: 2021-03-20

## 2021-03-20 ENCOUNTER — PATIENT MESSAGE (OUTPATIENT)
Dept: OBGYN CLINIC | Age: 36
End: 2021-03-20

## 2021-03-20 DIAGNOSIS — Z98.890 S/P ROBOT-ASSISTED SURGICAL PROCEDURE: Primary | ICD-10-CM

## 2021-03-20 RX ORDER — HYDROCODONE BITARTRATE AND ACETAMINOPHEN 5; 325 MG/1; MG/1
1 TABLET ORAL EVERY 6 HOURS PRN
Qty: 20 TABLET | Refills: 0 | Status: SHIPPED | OUTPATIENT
Start: 2021-03-20 | End: 2021-03-20

## 2021-03-20 RX ORDER — HYDROCODONE BITARTRATE AND ACETAMINOPHEN 5; 325 MG/1; MG/1
2 TABLET ORAL EVERY 6 HOURS PRN
Qty: 18 TABLET | Refills: 0 | Status: SHIPPED | OUTPATIENT
Start: 2021-03-20 | End: 2021-03-23

## 2021-03-20 NOTE — PROGRESS NOTES
Department of Gynecology  Attending Progress Note          SUBJECTIVE:  27 y/o  female S/P uncomplicated Davinci robotic total laparoscopic hysterectomy, bilateral salpingectomy secondary to menorrhagia and severe dysmenorrhea, and uterine fibroid, POD #1. No current complaints are noted including headache, change in vision, fever, chills, chest pain, shortness of breath, nausea, vomiting, diarrhea or constipation. The patient denies any urinary complaints or calf tenderness. Voiding, ambulating, passing flatus, tolerating regular diet. Pain control is adequate.       OBJECTIVE:           Physical Exam  VITALS:  /76   Pulse 95   Temp 98.1 °F (36.7 °C) (Oral)   Resp 16   Ht 5' 7\" (1.702 m)   Wt 142 lb 3 oz (64.5 kg)   LMP 2021   SpO2 96%   BMI 22.27 kg/m²   CONSTITUTIONAL:  awake, alert, cooperative, no apparent distress, and appears stated age  BACK:  symmetric  LUNGS:  No increased work of breathing, good air exchange, clear to auscultation bilaterally, no crackles or wheezing  CARDIOVASCULAR:  normal S1 and S2  ABDOMEN:  soft, non-distended and non-tender, incisions clean dry intact, no apparent signs of infection or compromise  MUSCULOSKELETAL:  full range of motion noted  NEUROLOGIC:  Mental Status Exam:  Level of Alertness:   awake  Orientation:   person, place, time  Memory:   normal  Fund of Knowledge:  normal  Attention/Concentration:  normal  Language:  normal  SKIN:  normal skin color, texture, turgor    DATA:  CBC with Differential:    Lab Results   Component Value Date    WBC 15.3 2021    RBC 3.93 2021    HGB 11.6 2021    HCT 35.3 2021     2021    MCV 89.8 2021    MCH 29.5 2021    MCHC 32.9 2021    RDW 13.3 2021    BANDSPCT 5 10/18/2017    LYMPHOPCT 17.7 2021    MONOPCT 10.1 2021    BASOPCT 0.1 2021    MONOSABS 1.5 2021    LYMPHSABS 2.7 2021    EOSABS 0.0 2021    BASOSABS 0.0 03/19/2021       ASSESSMENT AND PLAN:  Impression:  S/P  Sayda robotic total laparoscopic hysterectomy, bilateral salpingectomy POD #1  Mild anemia    Plan:   See orders and Patient Instructions  Home today

## 2021-03-20 NOTE — TELEPHONE ENCOUNTER
Received call from patient via answering service on 3/20/2021 at 17:30. States RLQ incision \"has opened and can see inside\". Patient presented to hospital for incision check at 18:15:   Incision evaluated--no apparent signs of infection or compromise. Incision site cleansed. Steri strip and Mastisol applied to area as well as a sterile bandage. Admits to increased pain at incision site. Has been using Percocet and alternating with ibuprofen. Feels Percocet is not helping. Requesting Vicodin instead of Percocet. Will take Percocet to disposal unit at nearest facility and discard remaining pills. Patient and patient's mother understand that Percocet is to be discarded. Rx for #18 vicodin provided. Patient to follow up for scheduled postop visit. GENIA Matthews D.O.

## 2021-03-21 NOTE — OP NOTE
[address] [address]                                OPERATIVE REPORT    PATIENT NAME: Flynn Hand                    :        1985  MED REC NO:   [id number]                          ROOM:       [id number]  ACCOUNT NO:   [de-identified]                           ADMIT DATE: 2021  PROVIDER:     Duane Malhotra DO    DATE OF PROCEDURE:  2021    PREOPERATIVE DIAGNOSES:  Dysmenorrhea, menorrhagia, uterine fibroids. POSTOPERATIVE DIAGNOSES:  Dysmenorrhea, menorrhagia, uterine fibroids  with endometriosis. OPERATION PERFORMED:  Dianna Dale robotic total laparoscopic hysterectomy,  bilateral salpingectomy. SURGEON:  Duane Malhotra DO    ANESTHESIA:  General endotracheal anesthetic. ESTIMATED BLOOD LOSS:  50 mL. IV FLUIDS:  1500 mL of crystalloid. URINE OUTPUT:  75 mL of clear yellow urine. COMPLICATIONS:  None. SPECIMENS:  Cervix, uterus, bilateral fallopian tubes with attached  fibroid. DISPOSITION:  To PACU. CONDITION:  Stable. FINDINGS: Multiple implants of endometriosis within the  cul-de-sac, increased vascularity noted, free floating cystic  lesion consistent with a paratubal cyst, enlarged appearing gallbladder  and uterine fibroid. INDICATIONS:  The patient is a 27-year-old  3, para 2-1-0-2  female, who presents for definitive therapy in the form of hysterectomy  secondary to menorrhagia, severe dysmenorrhea, and uterine fibroids. The patient's periods occur every month for one week with very heavy  flow for which she changes Super Plus tampon every hour. She will go through 6  to 8 tampons and pads every day and experiences significant  dysmenorrhea. She has had chronic issues with her menstrual cycles. Symptoms have continued to worsen overtime. The patient has tried and  failed conservative therapy.     OPERATIVE PROCEDURE:  The patient was taken to OR on 03/18/2021. She  was given preoperative antibiotics and bilateral lower extremity SCDs  were placed. She was then placed under general endotracheal anesthetic  and positioned in dorsal lithotomy position. She was then prepped and  draped in the usual fashion. Upon prepping and draping the patient, a  time-out was performed. Upon performing the time-out, a Collins catheter  was placed. A speculum was then placed in the patient's vagina and  sutures were applied at the 3 o'clock and 9 o'clock positions of the  uterus. The uterus was sounded to a depth of 8 cm. A GIORGIO uterine  manipulator was then applied to the cervix uterus using a 6 tip  and 3.0 cup. After placement of the GIORGIO manipulator, attention was  turned to the patient's abdomen, where a supraumbilical skin incision  was made. A 5-mm blunt trocar was inserted in the patient's abdomen  under direct visualization and the pneumoperitoneum was then introduced. Upon introduction of the pneumoperitoneum, evaluation of the pelvis  revealed a large fibroid coming off of the posterior aspect of the  fundus. Further findings included white lesions of endometriosis as  well as increased vascularity throughout the cul-de-sac. A free  floating cystic structure was noted within the cul-de-sac and appeared consistent with a sloughed paratubal cyst.  After evaluation of the pelvis, identification of the  uterine fibroid as well as endometriosis, both ovaries were evaluated  and found to be completely normal.  The fallopian tube was normal as well. The uterus was found to be boggy and consistent with  adenomyosis appearance. After evaluation, two further ports were placed in the right upper  quadrant and left upper quadrant using 5-mm blunt trocars and placing  robotic sleeves, a 12-mm disposable trocar was placed in the right lower  quadrant. The supraumbilical trocar was replaced with an 8 mm da Jasbir  robotic sleeve.   After placement of all trocars, the patient was placed  in maximum straight and steep Trendelenburg and a robot was docked. Upon docking the robot, the findings noted upon initial evaluation were  confirmed. Both ureters were identified and found to peristalse  bilaterally. Both ureters were found to be away from the operative  focus. Both fallopian tubes were then dissected away from the adnexa  and removed. The round ligament on each side was then cauterized and  transected. The anterior leaf of broad ligament was then taken down at  the level of the bladder and the bladder reflection was created sharply. Upon creation of the bladder flap, the remainder of the broad ligament  was taken down bilaterally to the level of the uterine arteries. The  uterine arteries were then cauterized and transected. A colpotomy was  performed and the uterus and uterine fibroid were delivered in total via  the vagina. After delivery of the specimen, the vaginal cuff was closed  in a running fashion using 0 PDS. Two further sutures were placed at  the lateral aspects of the vaginal cuff using figure-of-eight sutures of  0 Vicryl. After closure of the cuff, copious irrigation was performed  and excellent hemostasis was noted. The right lower quadrant port was  closed at the fascia in a single interrupted fashion using the closure  device and 0 Vicryl suture. After closure of the right lower quadrant  port, the pneumoperitoneum was then reduced. Upon reduction of the  pneumoperitoneum, the pneumoperitoneum was reintroduced to ensure no  bleeding from the pedicles or vaginal cuff had occurred. The  pneumoperitoneum again was reduced and all instruments were removed. The skin incisions were closed in subcuticular fashion, Steri-Strips,  and Mastisol were applied. The vagina was then evaluated and found to  be free of all trauma, bleeding or other abnormality. Hemostasis was  noted. All instruments were removed.   For the case, sponge, lap and  needle counts were correct x2. The patient tolerated the procedure well  and was taken to the PACU in stable condition.         Bernard Kohil DO    D: 03/21/2021 1:41:54       T: 03/21/2021 5:56:19     [email or web address]  Job#: [id number]     Doc#: [id number]    CC:

## 2021-03-23 ENCOUNTER — TELEPHONE (OUTPATIENT)
Dept: OBGYN CLINIC | Age: 36
End: 2021-03-23

## 2021-03-23 NOTE — TELEPHONE ENCOUNTER
Pt due for post op 4/1. No time available ( physician out), scheduled for 3/29/21- same day. Pt is requesting a refill of her medication for pain. She says she is not doing great.  Please advise

## 2021-03-25 NOTE — TELEPHONE ENCOUNTER
Recommend patient alternate NSAID with pain medication and utilize binder with ice. Please provide patient with precautions. Please check on signs of infection, bowel issues and issues with urination. Patient will need to report to ER if severe symptoms.   Thanks

## 2021-03-26 ENCOUNTER — OFFICE VISIT (OUTPATIENT)
Dept: OBGYN CLINIC | Age: 36
End: 2021-03-26

## 2021-03-26 VITALS
BODY MASS INDEX: 22.6 KG/M2 | DIASTOLIC BLOOD PRESSURE: 84 MMHG | WEIGHT: 144 LBS | HEIGHT: 67 IN | TEMPERATURE: 98.3 F | HEART RATE: 76 BPM | SYSTOLIC BLOOD PRESSURE: 130 MMHG

## 2021-03-26 DIAGNOSIS — Z90.710 HISTORY OF ROBOT-ASSISTED LAPAROSCOPIC HYSTERECTOMY: ICD-10-CM

## 2021-03-26 DIAGNOSIS — Z98.890 S/P ROBOT-ASSISTED SURGICAL PROCEDURE: ICD-10-CM

## 2021-03-26 DIAGNOSIS — Z09 POSTOP CHECK: Primary | ICD-10-CM

## 2021-03-26 DIAGNOSIS — G89.18 POST-OP PAIN: ICD-10-CM

## 2021-03-26 PROBLEM — N94.6 SEVERE DYSMENORRHEA: Status: RESOLVED | Noted: 2021-03-08 | Resolved: 2021-03-26

## 2021-03-26 PROCEDURE — 99024 POSTOP FOLLOW-UP VISIT: CPT | Performed by: OBSTETRICS & GYNECOLOGY

## 2021-03-26 RX ORDER — OXYCODONE HYDROCHLORIDE AND ACETAMINOPHEN 5; 325 MG/1; MG/1
1 TABLET ORAL EVERY 6 HOURS PRN
Qty: 12 TABLET | Refills: 0 | Status: SHIPPED | OUTPATIENT
Start: 2021-03-26 | End: 2021-03-29

## 2021-03-26 ASSESSMENT — ENCOUNTER SYMPTOMS
CONSTIPATION: 0
ABDOMINAL PAIN: 1
SHORTNESS OF BREATH: 0
ABDOMINAL DISTENTION: 0
NAUSEA: 0
VOMITING: 0
DIARRHEA: 0

## 2021-03-26 ASSESSMENT — PATIENT HEALTH QUESTIONNAIRE - PHQ9
1. LITTLE INTEREST OR PLEASURE IN DOING THINGS: 0
SUM OF ALL RESPONSES TO PHQ9 QUESTIONS 1 & 2: 0
SUM OF ALL RESPONSES TO PHQ QUESTIONS 1-9: 0
2. FEELING DOWN, DEPRESSED OR HOPELESS: 0

## 2021-03-26 NOTE — PROGRESS NOTES
Subjective:      Patient ID: Evaristo Richardson is a 28 y.o. female. HPI  29 y/o  female presents for post-operative visit. Patient is 1 week s/p Davinci robotic total laparoscopic hysterectomy, bilateral salpingectomy secondary to menorrhagia and severe dysmenorrhea. Patient has had significant issues with pain control since surgery. Initially prescribed vicodin. Medication discarded and replaced with percocet. Patient states percocet has helped but has utilized all pills prescribed. Feels she has \"overdone\" it and has been too active since surgery. Has support at home and is able to rest just chooses not to. Denies vaginal bleeding and discharge. Denies fever, chills, chest pain, shortness of breath, nausea, vomiting, diarrhea, constipation, dysuria and hematuria. Patient is 3 years S/P uncomplicated Vaginal Delivery. The pregnancy was complicated by gestational diabetes A2, previous pregnancies with gestational DM, gestational HTN, fetal demise at 21 weeks and uterine fibroid. Sexually active, no problems. Medical history positive for recent issues with renal lithiasis. Review of Systems   Constitutional: Negative. Negative for activity change, appetite change, chills, fatigue, fever and unexpected weight change. Respiratory: Negative for shortness of breath. Cardiovascular: Negative for chest pain. Gastrointestinal: Positive for abdominal pain. Negative for abdominal distention, constipation, diarrhea, nausea and vomiting. Genitourinary: Positive for pelvic pain. Negative for difficulty urinating, dysuria, hematuria, vaginal bleeding, vaginal discharge and vaginal pain. Psychiatric/Behavioral: Negative. Objective:   Physical Exam  Vitals signs and nursing note reviewed. Constitutional:       General: She is not in acute distress. Appearance: Normal appearance. She is well-developed. She is not ill-appearing, toxic-appearing or diaphoretic.    Pulmonary:      Effort: Pulmonary effort is normal.   Abdominal:      General: There is no distension. Palpations: Abdomen is soft. Tenderness: There is no abdominal tenderness. There is no guarding. Comments: Incisions clean dry intact. No apparent signs of infection or compromise. Skin:     General: Skin is warm and dry. Neurological:      Mental Status: She is alert and oriented to person, place, and time. Psychiatric:         Behavior: Behavior normal.         Thought Content: Thought content normal.         Judgment: Judgment normal.         Assessment:       Diagnosis Orders   1. Postop check     2. Post-op pain  URINALYSIS WITH MICROSCOPIC    Culture, Urine   3. S/P robot-assisted surgical procedure  oxyCODONE-acetaminophen (PERCOCET) 5-325 MG per tablet    URINALYSIS WITH MICROSCOPIC    Culture, Urine   4. History of robot-assisted laparoscopic hysterectomy             Plan:      Orders Placed This Encounter   Procedures    Culture, Urine    URINALYSIS WITH MICROSCOPIC     Rx refill Percocet--#12--7 days postop hysterectomy with significant findings of endometriosis, etc.  Discussed patient's use of opioid pain pills in recent months. January: 20, February 52, March (postop) 46. Pattern concerning for potential for addiction. Patient will receive no further prescriptions unless it is in conjunction with counseling and intervention. Patient understands and feels she will be able to space out remaining medication for remainder of recovery. Patient agrees to not seek any other sources for pain control. Follow up in 1 week.            Andrea Matthews DO

## 2021-03-27 LAB
BACTERIA: ABNORMAL /HPF
BILIRUBIN URINE: NEGATIVE
BLOOD, URINE: ABNORMAL
CLARITY: CLEAR
COLOR: YELLOW
COMMENT UA: ABNORMAL
CRYSTALS, UA: ABNORMAL /HPF
EPITHELIAL CELLS, UA: 6 /HPF (ref 0–5)
GLUCOSE URINE: NEGATIVE MG/DL
HYALINE CASTS: 1 /LPF (ref 0–8)
KETONES, URINE: NEGATIVE MG/DL
LEUKOCYTE ESTERASE, URINE: ABNORMAL
MICROSCOPIC EXAMINATION: YES
NITRITE, URINE: NEGATIVE
PH UA: 6 (ref 5–8)
PROTEIN UA: NEGATIVE MG/DL
RBC UA: 0 /HPF (ref 0–4)
SPECIFIC GRAVITY UA: 1.01 (ref 1–1.03)
URINE TYPE: ABNORMAL
UROBILINOGEN, URINE: 0.2 E.U./DL
WBC UA: 5 /HPF (ref 0–5)

## 2021-03-28 LAB — URINE CULTURE, ROUTINE: NORMAL

## 2021-04-05 ENCOUNTER — OFFICE VISIT (OUTPATIENT)
Dept: OBGYN CLINIC | Age: 36
End: 2021-04-05

## 2021-04-05 VITALS
TEMPERATURE: 97.2 F | HEART RATE: 113 BPM | BODY MASS INDEX: 23.02 KG/M2 | DIASTOLIC BLOOD PRESSURE: 80 MMHG | SYSTOLIC BLOOD PRESSURE: 130 MMHG | WEIGHT: 147 LBS

## 2021-04-05 DIAGNOSIS — Z90.710 HISTORY OF ROBOT-ASSISTED LAPAROSCOPIC HYSTERECTOMY: ICD-10-CM

## 2021-04-05 DIAGNOSIS — N20.0 RENAL LITHIASIS: ICD-10-CM

## 2021-04-05 DIAGNOSIS — Z98.890 S/P ROBOT-ASSISTED SURGICAL PROCEDURE: ICD-10-CM

## 2021-04-05 DIAGNOSIS — R10.2 CHRONIC PELVIC PAIN IN FEMALE: ICD-10-CM

## 2021-04-05 DIAGNOSIS — Z86.32 HISTORY OF GESTATIONAL DIABETES: ICD-10-CM

## 2021-04-05 DIAGNOSIS — Z09 POSTOP CHECK: Primary | ICD-10-CM

## 2021-04-05 DIAGNOSIS — Z98.890 S/P ROBOT-ASSISTED SURGICAL PROCEDURE: Primary | ICD-10-CM

## 2021-04-05 DIAGNOSIS — G89.29 CHRONIC PELVIC PAIN IN FEMALE: ICD-10-CM

## 2021-04-05 PROCEDURE — 99024 POSTOP FOLLOW-UP VISIT: CPT | Performed by: OBSTETRICS & GYNECOLOGY

## 2021-04-05 ASSESSMENT — ENCOUNTER SYMPTOMS
ABDOMINAL PAIN: 0
ABDOMINAL DISTENTION: 0
CONSTIPATION: 0
SHORTNESS OF BREATH: 0
VOMITING: 0
DIARRHEA: 0
NAUSEA: 0

## 2021-04-05 NOTE — TELEPHONE ENCOUNTER
Spoke with pt. She says she had tried TORADOL in the past and it did not help. She would like to have Tramadol sent in to listed pharmacy. Medication Phoned in.

## 2021-04-05 NOTE — PROGRESS NOTES
Subjective:      Patient ID: Jenn Fontana is a 39 y.o. female. HPI  40 y/o  female presents for post-operative visit. Patient is 3 weeks s/p Davinci robotic total laparoscopic hysterectomy, bilateral salpingectomy secondary to menorrhagia and severe dysmenorrhea. Issues with pain control have improved somewhat. Has returned to full activity aside from intercourse. Feels current pain is primarily related to renal lithiasis. Aware that further postop narcotic medication is not recommended. Has never seen urology for renal lithiasis. Open to referral to urology for evaluation. Denies vaginal bleeding and discharge. Denies fever, chills, chest pain, shortness of breath, nausea, vomiting, diarrhea, constipation, dysuria and hematuria. Patient is 3 years S/P uncomplicated Vaginal Delivery. The pregnancy was complicated by gestational diabetes A2, previous pregnancies with gestational DM, gestational HTN, fetal demise at 21 weeks and uterine fibroid. Review of Systems   Constitutional: Negative. Negative for activity change, appetite change, chills, fatigue, fever and unexpected weight change. Respiratory: Negative for shortness of breath. Cardiovascular: Negative for chest pain. Gastrointestinal: Negative for abdominal distention, abdominal pain, constipation, diarrhea, nausea and vomiting. Genitourinary: Positive for pelvic pain. Negative for difficulty urinating, dysuria, frequency, hematuria (no visible blood), vaginal bleeding, vaginal discharge and vaginal pain. Psychiatric/Behavioral: Negative. Objective:   Physical Exam  Vitals signs and nursing note reviewed. Constitutional:       General: She is not in acute distress. Appearance: Normal appearance. She is well-developed. She is not ill-appearing, toxic-appearing or diaphoretic. Pulmonary:      Effort: Pulmonary effort is normal.      Breath sounds: Normal breath sounds.    Abdominal:      General: There is no distension. Palpations: Abdomen is soft. Tenderness: There is no abdominal tenderness. There is no guarding. Comments: Incisions clean dry intact. No apparent signs of infection or compromise. Skin:     General: Skin is warm and dry. Neurological:      Mental Status: She is alert and oriented to person, place, and time. Psychiatric:         Behavior: Behavior normal.         Thought Content: Thought content normal.         Judgment: Judgment normal.         Assessment:       Diagnosis Orders   1. Postop check     2. History of robot-assisted laparoscopic hysterectomy     3. S/P robot-assisted surgical procedure     4. Renal lithiasis  [place] - Jairon Rogers MD, Urology, [facility]   5. Chronic pelvic pain in female     6. History of gestational diabetes             Plan:      Orders Placed This Encounter   Procedures   Gabriel Salcido MD, Urology, [facility]     Follow up prn and 3 weeks for postop visit.            Danette Matthews DO

## 2021-04-06 RX ORDER — TRAMADOL HYDROCHLORIDE 50 MG/1
50 TABLET ORAL EVERY 6 HOURS PRN
Qty: 28 TABLET | Refills: 0 | OUTPATIENT
Start: 2021-04-06 | End: 2021-04-13

## 2021-05-10 ENCOUNTER — TELEPHONE (OUTPATIENT)
Dept: OBGYN CLINIC | Age: 36
End: 2021-05-10

## 2021-06-30 ENCOUNTER — HOSPITAL ENCOUNTER (EMERGENCY)
Age: 36
Discharge: HOME | End: 2021-06-30
Attending: EMERGENCY MEDICINE
Payer: COMMERCIAL

## 2021-06-30 ENCOUNTER — APPOINTMENT (OUTPATIENT)
Dept: CT IMAGING | Age: 36
End: 2021-06-30
Payer: COMMERCIAL

## 2021-06-30 VITALS
BODY MASS INDEX: 22.76 KG/M2 | DIASTOLIC BLOOD PRESSURE: 88 MMHG | RESPIRATION RATE: 16 BRPM | TEMPERATURE: 98.4 F | HEART RATE: 138 BPM | WEIGHT: 145 LBS | HEIGHT: 67 IN | SYSTOLIC BLOOD PRESSURE: 137 MMHG | OXYGEN SATURATION: 99 %

## 2021-06-30 DIAGNOSIS — R10.9 FLANK PAIN, ACUTE: Primary | ICD-10-CM

## 2021-06-30 DIAGNOSIS — N20.0 KIDNEY STONE: ICD-10-CM

## 2021-06-30 DIAGNOSIS — R31.9 HEMATURIA, UNSPECIFIED TYPE: ICD-10-CM

## 2021-06-30 LAB
A/G RATIO: 1.5 (ref 1.1–2.2)
ALBUMIN SERPL-MCNC: 4.7 G/DL (ref 3.4–5)
ALP BLD-CCNC: 65 U/L (ref 40–129)
ALT SERPL-CCNC: 8 U/L (ref 10–40)
ANION GAP SERPL CALCULATED.3IONS-SCNC: 13 MMOL/L (ref 3–16)
AST SERPL-CCNC: 15 U/L (ref 15–37)
BACTERIA: ABNORMAL /HPF
BASOPHILS ABSOLUTE: 0.1 K/UL (ref 0–0.2)
BASOPHILS RELATIVE PERCENT: 0.6 %
BILIRUB SERPL-MCNC: 0.6 MG/DL (ref 0–1)
BILIRUBIN URINE: NEGATIVE
BLOOD, URINE: ABNORMAL
BUN BLDV-MCNC: 18 MG/DL (ref 7–20)
CALCIUM SERPL-MCNC: 9.8 MG/DL (ref 8.3–10.6)
CHLORIDE BLD-SCNC: 100 MMOL/L (ref 99–110)
CLARITY: CLEAR
CO2: 24 MMOL/L (ref 21–32)
COLOR: YELLOW
CREAT SERPL-MCNC: 0.6 MG/DL (ref 0.6–1.1)
EOSINOPHILS ABSOLUTE: 0.2 K/UL (ref 0–0.6)
EOSINOPHILS RELATIVE PERCENT: 1.9 %
EPITHELIAL CELLS, UA: ABNORMAL /HPF (ref 0–5)
GFR AFRICAN AMERICAN: >60
GFR NON-AFRICAN AMERICAN: >60
GLOBULIN: 3.1 G/DL
GLUCOSE BLD-MCNC: 96 MG/DL (ref 70–99)
GLUCOSE URINE: NEGATIVE MG/DL
HCT VFR BLD CALC: 40 % (ref 36–48)
HEMOGLOBIN: 13.7 G/DL (ref 12–16)
KETONES, URINE: NEGATIVE MG/DL
LEUKOCYTE ESTERASE, URINE: NEGATIVE
LYMPHOCYTES ABSOLUTE: 2.1 K/UL (ref 1–5.1)
LYMPHOCYTES RELATIVE PERCENT: 18.6 %
MCH RBC QN AUTO: 30 PG (ref 26–34)
MCHC RBC AUTO-ENTMCNC: 34.1 G/DL (ref 31–36)
MCV RBC AUTO: 87.8 FL (ref 80–100)
MICROSCOPIC EXAMINATION: YES
MONOCYTES ABSOLUTE: 0.9 K/UL (ref 0–1.3)
MONOCYTES RELATIVE PERCENT: 7.6 %
NEUTROPHILS ABSOLUTE: 8.2 K/UL (ref 1.7–7.7)
NEUTROPHILS RELATIVE PERCENT: 71.3 %
NITRITE, URINE: NEGATIVE
PDW BLD-RTO: 13.3 % (ref 12.4–15.4)
PH UA: 6 (ref 5–8)
PLATELET # BLD: 368 K/UL (ref 135–450)
PMV BLD AUTO: 7.6 FL (ref 5–10.5)
POTASSIUM REFLEX MAGNESIUM: 3.8 MMOL/L (ref 3.5–5.1)
PROTEIN UA: NEGATIVE MG/DL
RBC # BLD: 4.56 M/UL (ref 4–5.2)
RBC UA: ABNORMAL /HPF (ref 0–4)
SODIUM BLD-SCNC: 137 MMOL/L (ref 136–145)
SPECIFIC GRAVITY UA: 1.01 (ref 1–1.03)
TOTAL PROTEIN: 7.8 G/DL (ref 6.4–8.2)
URINE REFLEX TO CULTURE: ABNORMAL
URINE TYPE: ABNORMAL
UROBILINOGEN, URINE: 0.2 E.U./DL
WBC # BLD: 11.5 K/UL (ref 4–11)
WBC UA: ABNORMAL /HPF (ref 0–5)

## 2021-06-30 PROCEDURE — 81001 URINALYSIS AUTO W/SCOPE: CPT

## 2021-06-30 PROCEDURE — 99285 EMERGENCY DEPT VISIT HI MDM: CPT

## 2021-06-30 PROCEDURE — 74176 CT ABD & PELVIS W/O CONTRAST: CPT

## 2021-06-30 PROCEDURE — 6360000002 HC RX W HCPCS: Performed by: EMERGENCY MEDICINE

## 2021-06-30 PROCEDURE — 96375 TX/PRO/DX INJ NEW DRUG ADDON: CPT

## 2021-06-30 PROCEDURE — 2580000003 HC RX 258: Performed by: EMERGENCY MEDICINE

## 2021-06-30 PROCEDURE — 80053 COMPREHEN METABOLIC PANEL: CPT

## 2021-06-30 PROCEDURE — 85025 COMPLETE CBC W/AUTO DIFF WBC: CPT

## 2021-06-30 PROCEDURE — 96374 THER/PROPH/DIAG INJ IV PUSH: CPT

## 2021-06-30 RX ORDER — ONDANSETRON 2 MG/ML
4 INJECTION INTRAMUSCULAR; INTRAVENOUS ONCE
Status: COMPLETED | OUTPATIENT
Start: 2021-06-30 | End: 2021-06-30

## 2021-06-30 RX ORDER — HYDROCODONE BITARTRATE AND ACETAMINOPHEN 5; 325 MG/1; MG/1
1 TABLET ORAL EVERY 6 HOURS PRN
Qty: 10 TABLET | Refills: 0 | Status: SHIPPED | OUTPATIENT
Start: 2021-06-30 | End: 2021-07-03

## 2021-06-30 RX ORDER — SULFAMETHOXAZOLE AND TRIMETHOPRIM 800; 160 MG/1; MG/1
1 TABLET ORAL 2 TIMES DAILY
Qty: 20 TABLET | Refills: 0 | Status: SHIPPED | OUTPATIENT
Start: 2021-06-30 | End: 2021-07-10

## 2021-06-30 RX ORDER — 0.9 % SODIUM CHLORIDE 0.9 %
1000 INTRAVENOUS SOLUTION INTRAVENOUS ONCE
Status: COMPLETED | OUTPATIENT
Start: 2021-06-30 | End: 2021-06-30

## 2021-06-30 RX ORDER — MORPHINE SULFATE 4 MG/ML
4 INJECTION, SOLUTION INTRAMUSCULAR; INTRAVENOUS ONCE
Status: COMPLETED | OUTPATIENT
Start: 2021-06-30 | End: 2021-06-30

## 2021-06-30 RX ORDER — KETOROLAC TROMETHAMINE 30 MG/ML
30 INJECTION, SOLUTION INTRAMUSCULAR; INTRAVENOUS ONCE
Status: COMPLETED | OUTPATIENT
Start: 2021-06-30 | End: 2021-06-30

## 2021-06-30 RX ADMIN — MORPHINE SULFATE 4 MG: 4 INJECTION, SOLUTION INTRAMUSCULAR; INTRAVENOUS at 12:56

## 2021-06-30 RX ADMIN — ONDANSETRON 4 MG: 2 INJECTION INTRAMUSCULAR; INTRAVENOUS at 12:56

## 2021-06-30 RX ADMIN — SODIUM CHLORIDE 1000 ML: 9 INJECTION, SOLUTION INTRAVENOUS at 11:29

## 2021-06-30 RX ADMIN — KETOROLAC TROMETHAMINE 30 MG: 30 INJECTION, SOLUTION INTRAMUSCULAR; INTRAVENOUS at 11:29

## 2021-06-30 ASSESSMENT — PAIN DESCRIPTION - PAIN TYPE: TYPE: ACUTE PAIN

## 2021-06-30 ASSESSMENT — PAIN SCALES - GENERAL
PAINLEVEL_OUTOF10: 8
PAINLEVEL_OUTOF10: 7
PAINLEVEL_OUTOF10: 8
PAINLEVEL_OUTOF10: 6
PAINLEVEL_OUTOF10: 8

## 2021-06-30 ASSESSMENT — PAIN DESCRIPTION - LOCATION: LOCATION: FLANK

## 2021-06-30 ASSESSMENT — PAIN DESCRIPTION - ORIENTATION: ORIENTATION: RIGHT

## 2021-06-30 NOTE — ED PROVIDER NOTES
CHIEF COMPLAINT  Flank Pain (right-sided, starts under ribs and goes to her back )      HISTORY OF PRESENT ILLNESS  Sincere Patterson is a 39 y.o. female with a history of diabetes ( gestational ); generalized anxiety disorder; hypertension; who presents to the ED complaining of right-sided flank pain. She recently had a total laparoscopic hysterectomy and bilateral salpingectomy for chronic pelvic pain. She said for a while her pain improved. Over the last few days however she has had this pain which is really bothering her and she is worried that it might be related to kidney stones. No other complaints, modifying factors or associated symptoms. I have reviewed the following from the nursing documentation.     Past Medical History:   Diagnosis Date    Diabetes mellitus ([facility] [address] 2017    GESTATIONAL    Elevated blood pressure complicating pregnancy, antepartum 3/6/2014    MAXWELL (generalized anxiety disorder)     Hypertension     Proteinuria in pregnancy, antepartum 3/23/2014    Septal defect     spontaneous closure     Past Surgical History:   Procedure Laterality Date    CYST REMOVAL Left     eye    HYSTERECTOMY N/A 3/18/2021    DAVINCI ROBOTIC TOTAL LAPAROSCOPIC HYSTERECTOMY, BILATERAL SALPINGECTOMY,   CPT CODE - 45752 performed by Keith Lesch, DO at [address] [place] [address] Drive EXTRACTION  2007     Family History   Problem Relation Age of Onset    High Blood Pressure Mother     Diabetes Mother     Cancer Father     High Blood Pressure Father     Diabetes Father     Lung Cancer Maternal Grandmother     Other Maternal Grandfather         Sclerosis     Premature Birth Son      Social History     Socioeconomic History    Marital status:      Spouse name: Scot Sutton Number of children: 1    Years of education: 13    Highest education level: Not on file   Occupational History    Occupation: Accounts payable     Comment: [employer]   Tobacco Use    Smoking status: Never Smoker    Smokeless tobacco: Never Used   Vaping Use    Vaping Use: Never used   Substance and Sexual Activity    Alcohol use: Not Currently    Drug use: No    Sexual activity: Yes     Partners: Male     Birth control/protection: Pill     Comment: OCP   Other Topics Concern    Not on file   Social History Narrative    Not on file     Social Determinants of Health     Financial Resource Strain:     Difficulty of Paying Living Expenses:    Food Insecurity:     Worried About Running Out of Food in the Last Year:     [address] in the Last Year:    Transportation Needs:     Lack of Transportation (Medical):  Lack of Transportation (Non-Medical):    Physical Activity:     Days of Exercise per Week:     Minutes of Exercise per Session:    Stress:     Feeling of Stress :    Social Connections:     Frequency of Communication with Friends and Family:     Frequency of Social Gatherings with Friends and Family:     Attends Yarsani Services:     Active Member of Clubs or Organizations:     Attends Club or Organization Meetings:     Marital Status:    Intimate Partner Violence:     Fear of Current or Ex-Partner:     Emotionally Abused:     Physically Abused:     Sexually Abused:      No current facility-administered medications for this encounter. Current Outpatient Medications   Medication Sig Dispense Refill    docusate sodium (COLACE) 100 MG capsule Take 1 capsule by mouth 2 times daily as needed for Constipation 30 capsule 1    DULoxetine (CYMBALTA) 30 MG extended release capsule Take 30 mg by mouth nightly       atenolol (TENORMIN) 25 MG tablet Take 50 mg by mouth nightly        No Known Allergies    REVIEW OF SYSTEMS  10 systems reviewed, pertinent positives per HPI otherwise noted to be negative.     PHYSICAL EXAM  /88   Pulse 138   Temp 98.4 °F (36.9 °C) (Oral)   Resp 16   Ht 5' 7\" (1.702 m)   Wt 145 lb (65.8 kg)   LMP 03/12/2021   SpO2 99% BMI 22.71 kg/m²   GENERAL APPEARANCE: Awake and alert. Cooperative. Anxious, Painful distress   HEAD: Normocephalic. Atraumatic. EYES: PERRL. EOM's grossly intact. ENT: Mucous membranes are moist.   NECK: Supple, trachea midline. HEART: RRR. Normal S1S2, no rubs, gallops, or murmurs noted  LUNGS: Respirations unlabored. CTAB. Good air exchange. No wheezes, rales, or rhonchi. Speaking comfortably in full sentences. ABDOMEN: Soft. Non-distended. Non-tender. No guarding or rebound. Normal bowel sounds. + right flank pain without tenderness  EXTREMITIES: No peripheral edema. MAEE. No acute deformities. SKIN: Warm and dry. No acute rashes. NEUROLOGICAL: Alert and oriented X 3. CN II-XII intact. No gross facial drooping. Strength 5/5, sensation intact. Normal coordination. No pronator drift. No truncal instability; Gait normal.   PSYCHIATRIC: Normal mood and affect. LABS  I have reviewed all labs for this visit.    Results for orders placed or performed during the hospital encounter of 06/30/21   CBC Auto Differential   Result Value Ref Range    WBC 11.5 (H) 4.0 - 11.0 K/uL    RBC 4.56 4.00 - 5.20 M/uL    Hemoglobin 13.7 12.0 - 16.0 g/dL    Hematocrit 40.0 36.0 - 48.0 %    MCV 87.8 80.0 - 100.0 fL    MCH 30.0 26.0 - 34.0 pg    MCHC 34.1 31.0 - 36.0 g/dL    RDW 13.3 12.4 - 15.4 %    Platelets 609 296 - 778 K/uL    MPV 7.6 5.0 - 10.5 fL    Neutrophils % 71.3 %    Lymphocytes % 18.6 %    Monocytes % 7.6 %    Eosinophils % 1.9 %    Basophils % 0.6 %    Neutrophils Absolute 8.2 (H) 1.7 - 7.7 K/uL    Lymphocytes Absolute 2.1 1.0 - 5.1 K/uL    Monocytes Absolute 0.9 0.0 - 1.3 K/uL    Eosinophils Absolute 0.2 0.0 - 0.6 K/uL    Basophils Absolute 0.1 0.0 - 0.2 K/uL   Comprehensive Metabolic Panel w/ Reflex to MG   Result Value Ref Range    Sodium 137 136 - 145 mmol/L    Potassium reflex Magnesium 3.8 3.5 - 5.1 mmol/L    Chloride 100 99 - 110 mmol/L    CO2 24 21 - 32 mmol/L    Anion Gap 13 3 - 16    Glucose 96 70 - 99 mg/dL    BUN 18 7 - 20 mg/dL    CREATININE 0.6 0.6 - 1.1 mg/dL    GFR Non-African American >60 >60    GFR African American >60 >60    Calcium 9.8 8.3 - 10.6 mg/dL    Total Protein 7.8 6.4 - 8.2 g/dL    Albumin 4.7 3.4 - 5.0 g/dL    Albumin/Globulin Ratio 1.5 1.1 - 2.2    Total Bilirubin 0.6 0.0 - 1.0 mg/dL    Alkaline Phosphatase 65 40 - 129 U/L    ALT 8 (L) 10 - 40 U/L    AST 15 15 - 37 U/L    Globulin 3.1 g/dL   Urinalysis Reflex to Culture    Specimen: Urine, clean catch   Result Value Ref Range    Color, UA Yellow Straw/Yellow    Clarity, UA Clear Clear    Glucose, Ur Negative Negative mg/dL    Bilirubin Urine Negative Negative    Ketones, Urine Negative Negative mg/dL    Specific Gravity, UA 1.010 1.005 - 1.030    Blood, Urine MODERATE (A) Negative    pH, UA 6.0 5.0 - 8.0    Protein, UA Negative Negative mg/dL    Urobilinogen, Urine 0.2 <2.0 E.U./dL    Nitrite, Urine Negative Negative    Leukocyte Esterase, Urine Negative Negative    Microscopic Examination YES     Urine Type NotGiven     Urine Reflex to Culture Not Indicated    Microscopic Urinalysis   Result Value Ref Range    WBC, UA 6-9 (A) 0 - 5 /HPF    RBC, UA 5-10 (A) 0 - 4 /HPF    Epithelial Cells, UA 0-1 0 - 5 /HPF    Bacteria, UA Rare (A) None Seen /HPF             RADIOLOGY  X-RAYS:  I have reviewed radiologic plain film image(s). ALL OTHER NON-PLAIN FILM IMAGES SUCH AS CT, ULTRASOUND AND MRI HAVE BEEN READ BY THE RADIOLOGIST. CT ABDOMEN PELVIS WO CONTRAST Additional Contrast? None   Final Result   No CT evidence of obstructive uropathy or acute intraabdominal pathology. Bilateral nonobstructing renal calculi. Rechecks: Physical assessment performed. Pain improved with ED    ED COURSE/MDM  Patient seen and evaluated. Old records reviewed. Labs and imaging reviewed and results discussed with patient. Patient was given toradol, morphine  in the ED with good symptomatic relief. Patient was reassessed as noted above .  No acute pathology was noted ; may have passed a stone or had a urinary tract infection. She insists that these symptoms are consistent with her passing a kidney stone. Reviewed the option monitoring program.  Patient has no active prescriptions but has had frequent prescriptions over the last year for various musculoskeletal complaints. Spoke to her about the dangers of addiction with respect to narcotic pain medication. She reports that the NSAIDs do not help her pain. I recommended that she discuss with her primary care doctor getting to pain management if her pain does not resolve. And pt is safe for discharge home to follow up with her PCP. Plan of care discussed with patient and family. Patient and family in agreement with plan. Patient was given scripts for the following medications. I counseled patient how to take these medications. New Prescriptions    No medications on file       CLINICAL IMPRESSION  1. Flank pain, acute    2. Hematuria, unspecified type    3. Kidney stone        Blood pressure 137/88, pulse 138, temperature 98.4 °F (36.9 °C), temperature source Oral, resp. rate 16, height 5' 7\" (1.702 m), weight 145 lb (65.8 kg), last menstrual period 03/12/2021, SpO2 99 %, not currently breastfeeding. DISPOSITION  Antoni Robles was discharged to home in stable condition.       Raquel Cedeño MD  06/30/21 [id number]

## 2021-06-30 NOTE — ED NOTES
Patient ambulated to bathroom. States it burned when she urinated.      Nancy Javed RN  06/30/21 [id number]

## 2021-06-30 NOTE — ED NOTES
Verbal and written discharge instructions given. IV and telemetry monitor removed. Prescriptions given to patient. Patient ambulated to waiting room. Patient in stable condition, discharged home.        Ivonne Siu RN  06/30/21 [id number]

## 2021-07-30 ENCOUNTER — TELEPHONE (OUTPATIENT)
Dept: OBGYN CLINIC | Age: 36
End: 2021-07-30

## 2021-08-03 NOTE — TELEPHONE ENCOUNTER
Please apologize for the delay. Please see if area/bump is still present? If so ok to have patient come in for evaluation. Thanks.

## 2021-08-03 NOTE — TELEPHONE ENCOUNTER
Placed call to patient, stated the bump is now gone. No issues, questions or concerns noted.      Routing as [employer]

## (undated) DEVICE — TIP COVER ACCESSORY

## (undated) DEVICE — SOLUTION ANTIFOG VIS SYS CLEARIFY LAPSCP

## (undated) DEVICE — CANNULA SEAL

## (undated) DEVICE — Device

## (undated) DEVICE — SUTURE VCRL SZ 0 L27IN ABSRB UD L26MM CT-2 1/2 CIR J270H

## (undated) DEVICE — MANIPULATOR 6.7MM RUMI UTERINE 6CM STER

## (undated) DEVICE — TROCAR: Brand: KII FIOS FIRST ENTRY

## (undated) DEVICE — ARM DRAPE

## (undated) DEVICE — SCISSORS SURG DIA8MM MPLR CRV ENDOWRIST

## (undated) DEVICE — LONG TIP FORCEPS: Brand: ENDOWRIST

## (undated) DEVICE — SUTURE MCRYL SZ 4-0 L18IN ABSRB UD L19MM PS-2 3/8 CIR PRIM Y496G

## (undated) DEVICE — 3M™ TEGADERM™ TRANSPARENT FILM DRESSING FRAME STYLE, 1624W, 2-3/8 IN X 2-3/4 IN (6 CM X 7 CM), 100/CT 4CT/CASE: Brand: 3M™ TEGADERM™

## (undated) DEVICE — GOWN SIRUS NONREIN XL W/TWL: Brand: MEDLINE INDUSTRIES, INC.

## (undated) DEVICE — MEGA SUTURECUT ND: Brand: ENDOWRIST

## (undated) DEVICE — PAD TBL OP RM TRENDELENBURG STATIC TORSO W/STRAPS

## (undated) DEVICE — BLADELESS OBTURATOR: Brand: WECK VISTA

## (undated) DEVICE — FENESTRATED BIPOLAR FORCEPS: Brand: ENDOWRIST

## (undated) DEVICE — SUTURE VCRL SZ 0 L36IN ABSRB UD CT-1 L36MM 1/2 CIR TAPR PNT VCP946H

## (undated) DEVICE — GLOVE SURG SZ 65 THK91MIL LTX FREE SYN POLYISOPRENE

## (undated) DEVICE — TROCAR: Brand: KII OPTICAL ACCESS SYSTEM

## (undated) DEVICE — SOLUTION IV IRRIG WATER 1000ML POUR BRL 2F7114

## (undated) DEVICE — SYSTEM ES CUP DIA3CM PNEUMO OCCL BLLN DISP FOR CLIN POS

## (undated) DEVICE — GOWN SIRUS NONREIN LG W/TWL: Brand: MEDLINE INDUSTRIES, INC.

## (undated) DEVICE — SUTURE PDS II SZ 0 L27IN ABSRB VLT L26MM CT-2 1/2 CIR Z334H

## (undated) DEVICE — PUMP SUC IRR TBNG L10FT W/ HNDPC ASSEMB STRYKEFLOW 2

## (undated) DEVICE — COLUMN DRAPE